# Patient Record
Sex: MALE | Race: WHITE | NOT HISPANIC OR LATINO | Employment: OTHER | URBAN - NONMETROPOLITAN AREA
[De-identification: names, ages, dates, MRNs, and addresses within clinical notes are randomized per-mention and may not be internally consistent; named-entity substitution may affect disease eponyms.]

---

## 2023-06-22 DIAGNOSIS — F32.9 MAJOR DEPRESSIVE DISORDER, REMISSION STATUS UNSPECIFIED, UNSPECIFIED WHETHER RECURRENT: Primary | ICD-10-CM

## 2023-06-22 PROBLEM — F41.9 ANXIETY DISORDER: Status: ACTIVE | Noted: 2023-06-22

## 2023-06-22 PROBLEM — F32.A DEPRESSION: Status: ACTIVE | Noted: 2023-06-22

## 2023-06-22 RX ORDER — VENLAFAXINE HYDROCHLORIDE 150 MG/1
150 CAPSULE, EXTENDED RELEASE ORAL DAILY
Qty: 30 CAPSULE | Refills: 1 | Status: SHIPPED | OUTPATIENT
Start: 2023-06-22 | End: 2023-08-01 | Stop reason: SDUPTHER

## 2023-06-22 RX ORDER — ARIPIPRAZOLE 5 MG/1
1 TABLET ORAL DAILY
COMMUNITY
Start: 2018-08-30 | End: 2023-06-22 | Stop reason: SDUPTHER

## 2023-06-22 RX ORDER — VENLAFAXINE HYDROCHLORIDE 150 MG/1
1 CAPSULE, EXTENDED RELEASE ORAL DAILY
COMMUNITY
Start: 2017-01-27 | End: 2023-06-22 | Stop reason: SDUPTHER

## 2023-06-22 RX ORDER — ARIPIPRAZOLE 5 MG/1
5 TABLET ORAL DAILY
Qty: 30 TABLET | Refills: 1 | Status: SHIPPED | OUTPATIENT
Start: 2023-06-22 | End: 2023-08-01 | Stop reason: SDUPTHER

## 2023-08-01 ENCOUNTER — OFFICE VISIT (OUTPATIENT)
Dept: PRIMARY CARE | Facility: CLINIC | Age: 68
End: 2023-08-01
Payer: MEDICARE

## 2023-08-01 VITALS
BODY MASS INDEX: 22.45 KG/M2 | DIASTOLIC BLOOD PRESSURE: 64 MMHG | HEIGHT: 68 IN | OXYGEN SATURATION: 95 % | RESPIRATION RATE: 12 BRPM | WEIGHT: 148.1 LBS | SYSTOLIC BLOOD PRESSURE: 104 MMHG | TEMPERATURE: 98.8 F | HEART RATE: 64 BPM

## 2023-08-01 DIAGNOSIS — Z00.00 MEDICARE ANNUAL WELLNESS VISIT, SUBSEQUENT: Primary | ICD-10-CM

## 2023-08-01 DIAGNOSIS — Z12.11 COLON CANCER SCREENING: ICD-10-CM

## 2023-08-01 DIAGNOSIS — F32.A ANXIETY AND DEPRESSION: ICD-10-CM

## 2023-08-01 DIAGNOSIS — E78.5 HYPERLIPIDEMIA, UNSPECIFIED HYPERLIPIDEMIA TYPE: ICD-10-CM

## 2023-08-01 DIAGNOSIS — F41.9 ANXIETY AND DEPRESSION: ICD-10-CM

## 2023-08-01 DIAGNOSIS — Z23 IMMUNIZATION DUE: ICD-10-CM

## 2023-08-01 DIAGNOSIS — Z13.89 ENCOUNTER FOR SCREENING FOR OTHER DISORDER: ICD-10-CM

## 2023-08-01 DIAGNOSIS — Z12.5 SCREENING FOR PROSTATE CANCER: ICD-10-CM

## 2023-08-01 DIAGNOSIS — F32.9 MAJOR DEPRESSIVE DISORDER, REMISSION STATUS UNSPECIFIED, UNSPECIFIED WHETHER RECURRENT: ICD-10-CM

## 2023-08-01 PROBLEM — I83.93 VARICOSE VEINS OF LEGS: Status: ACTIVE | Noted: 2023-08-01

## 2023-08-01 PROBLEM — K21.9 ACID REFLUX: Status: ACTIVE | Noted: 2023-08-01

## 2023-08-01 PROBLEM — R73.03 PREDIABETES: Status: ACTIVE | Noted: 2023-08-01

## 2023-08-01 PROBLEM — H40.9 GLAUCOMA: Status: ACTIVE | Noted: 2023-08-01

## 2023-08-01 PROBLEM — R03.0 ELEVATED BP WITHOUT DIAGNOSIS OF HYPERTENSION: Status: ACTIVE | Noted: 2023-08-01

## 2023-08-01 PROBLEM — L72.0 EPIDERMOID CYST: Status: ACTIVE | Noted: 2023-08-01

## 2023-08-01 PROBLEM — E78.00 ELEVATED LDL CHOLESTEROL LEVEL: Status: ACTIVE | Noted: 2023-08-01

## 2023-08-01 PROBLEM — E55.9 VITAMIN D DEFICIENCY: Status: ACTIVE | Noted: 2023-08-01

## 2023-08-01 PROCEDURE — 1036F TOBACCO NON-USER: CPT | Performed by: FAMILY MEDICINE

## 2023-08-01 PROCEDURE — 1159F MED LIST DOCD IN RCRD: CPT | Performed by: FAMILY MEDICINE

## 2023-08-01 PROCEDURE — G0444 DEPRESSION SCREEN ANNUAL: HCPCS | Performed by: FAMILY MEDICINE

## 2023-08-01 PROCEDURE — 90677 PCV20 VACCINE IM: CPT | Performed by: FAMILY MEDICINE

## 2023-08-01 PROCEDURE — 1170F FXNL STATUS ASSESSED: CPT | Performed by: FAMILY MEDICINE

## 2023-08-01 PROCEDURE — G0009 ADMIN PNEUMOCOCCAL VACCINE: HCPCS | Performed by: FAMILY MEDICINE

## 2023-08-01 PROCEDURE — G0438 PPPS, INITIAL VISIT: HCPCS | Performed by: FAMILY MEDICINE

## 2023-08-01 PROCEDURE — 1160F RVW MEDS BY RX/DR IN RCRD: CPT | Performed by: FAMILY MEDICINE

## 2023-08-01 RX ORDER — VENLAFAXINE HYDROCHLORIDE 150 MG/1
150 CAPSULE, EXTENDED RELEASE ORAL DAILY
Qty: 90 CAPSULE | Refills: 3 | Status: SHIPPED | OUTPATIENT
Start: 2023-08-01 | End: 2024-07-31

## 2023-08-01 RX ORDER — LATANOPROST 50 UG/ML
SOLUTION/ DROPS OPHTHALMIC
COMMUNITY

## 2023-08-01 RX ORDER — ARIPIPRAZOLE 5 MG/1
5 TABLET ORAL DAILY
Qty: 90 TABLET | Refills: 3 | Status: SHIPPED | OUTPATIENT
Start: 2023-08-01 | End: 2024-07-31

## 2023-08-01 ASSESSMENT — ACTIVITIES OF DAILY LIVING (ADL)
DRESSING: INDEPENDENT
BATHING: INDEPENDENT
GROCERY_SHOPPING: INDEPENDENT
MANAGING_FINANCES: INDEPENDENT
DOING_HOUSEWORK: INDEPENDENT
TAKING_MEDICATION: INDEPENDENT

## 2023-08-01 ASSESSMENT — PATIENT HEALTH QUESTIONNAIRE - PHQ9
1. LITTLE INTEREST OR PLEASURE IN DOING THINGS: NOT AT ALL
SUM OF ALL RESPONSES TO PHQ9 QUESTIONS 1 AND 2: 0
2. FEELING DOWN, DEPRESSED OR HOPELESS: NOT AT ALL

## 2023-08-01 ASSESSMENT — LIFESTYLE VARIABLES: HOW OFTEN DO YOU HAVE A DRINK CONTAINING ALCOHOL: MONTHLY OR LESS

## 2023-08-01 NOTE — PROGRESS NOTES
"Subjective   Reason for Visit: Vincenzo Cummings is an 68 y.o. male here for a Medicare Wellness visit.     Past Medical, Surgical, and Family History reviewed and updated in chart.    Reviewed all medications by prescribing practitioner or clinical pharmacist (such as prescriptions, OTCs, herbal therapies and supplements) and documented in the medical record.    HPI MEDICARE WELLNESS  TDAP: 2007  SHINGRIX: #1 in 11/2020 - DUE FOR #2?  PNEUMOVAX: DUE?  CSCOPE: 5/2017 polyps x 2 (NO PATH), good prep  PSA: 0.80 in 3/2018   AAA SCREEN: none found  HEP C SCREEN: none found  CACS: none found  LIPID: 3/2018 TC/HDL ratio 3.4, , TRIG 162    He states he is doing well overall.  He is  from ex, living on his own.  Bought a house, no mortgage, doing a lot of his own repairs.  Retired from work.    Continues to go to Recovery meetings.  He works in [employer], plays in [employer].    States mood is doing good.  Not currently following with psych.  Regimen includes Venlafaxine and Abilify.    Patient is agreeable to Prevnar-20 vaccine.     Prostate cancer screening: Denies family history.   Denies hesitancy, nocturia, or urgency.     Colon cancer screening: Denies family history.   Denies melena, hematochezia, constipation, diarrhea, bloating, change in bowel habits.    Thyroid disorder screening: Denies family history.  Denies heat or cold intolerance, diarrhea or constipation, coarsening of hair, and palpitations.     Cardiac disorder screening: Denies family history.  Denies chest pain, SOB, palpitations, edema, dizziness.     Patient Care Team:  Sandi Howell DO as PCP - General  Sandi Howell DO as PCP - [employer] PCP     Review of Systems   All other systems reviewed and are negative.      Objective   Vitals:  /64 (BP Location: Right arm, Patient Position: Sitting, BP Cuff Size: Small adult)   Pulse 64   Temp 37.1 °C (98.8 °F) (Temporal)   Resp 12   Ht 1.715 m (5' 7.5\")   " Wt 67.2 kg (148 lb 1.6 oz)   SpO2 95%   BMI 22.85 kg/m²       Physical Exam  Vitals and nursing note reviewed.   Constitutional:       General: He is not in acute distress.     Appearance: Normal appearance. He is not toxic-appearing.   HENT:      Head: Normocephalic and atraumatic.   Eyes:      Extraocular Movements: Extraocular movements intact.      Pupils: Pupils are equal, round, and reactive to light.   Neck:      Thyroid: No thyromegaly.   Cardiovascular:      Rate and Rhythm: Normal rate and regular rhythm.      Heart sounds: No murmur heard.     No friction rub. No gallop.   Pulmonary:      Effort: Pulmonary effort is normal.      Breath sounds: Normal breath sounds. No wheezing, rhonchi or rales.   Musculoskeletal:      Right lower leg: No edema.      Left lower leg: No edema.   Lymphadenopathy:      Cervical: No cervical adenopathy.   Skin:     General: Skin is warm and dry.   Neurological:      General: No focal deficit present.      Mental Status: He is alert and oriented to person, place, and time.   Psychiatric:         Mood and Affect: Mood normal.         Behavior: Behavior normal.         Assessment/Plan   Problem List Items Addressed This Visit       Anxiety and depression    Current Assessment & Plan     Mood is stable on current regimen of Venlafaxine 150 mg and Abilify 5 mg.  Prescriptions refilled.         Relevant Medications    ARIPiprazole (Abilify) 5 mg tablet    venlafaxine XR (Effexor-XR) 150 mg 24 hr capsule    HLD (hyperlipidemia)    Current Assessment & Plan     3/2018 TC/HDL ratio 3.4, , TRIG 162  Goal TC/HDL ratio 3.4 or less, LDL 99 or less,  or less, and TRIG 150 or less.   Repeat lipid panel ordered today.  See diet and exercise recommendations via summary.         Relevant Orders    CBC    Comprehensive Metabolic Panel    Lipid Panel    Medicare annual wellness visit, subsequent - Primary    Overview     TDAP: 2007  SHINGRIX: #1 in 11/2020 - DUE FOR  #2?  PNEUMOVAX: 8/2023  CSCOPE: 5/2017 polyps x 2 (NO PATH), good prep  PSA: 0.80 in 3/2018   AAA SCREEN: none found  HEP C SCREEN: none found  CACS: none found  LIPID: 3/2018 TC/HDL ratio 3.4, , TRIG 162         Current Assessment & Plan     Vaccines and screenings reviewed.  Questionnaires completed.  Health and wellness topics reviewed.  Diet and exercise recommendations revisited.  Routine blood work ordered today, including PSA.    Prevnar-20 administered in office today.  Tetanus booster due, can get this at pharmacy.  Shingrix #2 due, to be done at pharmacy.    The 3 Colon Cancer screening tests were reviewed with patient, FIT Test, Colonoscopy and Cologuard. Risks and benefits of each test were discussed. Patient has elected to undergo colonoscopy. Referral placed, patient to return to established GI, Dr. Dunbar.    LIFESTYLE MEASURES  -make sure you are avoiding refined carbs such as breads, pasta, cereal, candy, soda,  nutrition bars, granola, chips, and sugar sweetened beverages.      -eat 5- 7 servings daily of veggies,  healthy protein such as chicken, fish,  beans, and eggs, and include healthy fats in your diet such as seeds, nuts, olive oil, avocados, and salmon.   -exercise 4 - 6 days per week as you are able, 150 minutes total weekly divided up is recommended.  -Vitamin D is recommended at 1000 - 5000 IU international units daily.   -Always wear sunscreen when you have sun exposure.  -64 oz of water is recommended daily.  -Dental visits recommended every 6 months.  -Eye exam recommended every 2 years, for those with vision problems every year.            Other Visit Diagnoses       Encounter for screening for other disorder        Screening for prostate cancer        Relevant Orders    Prostate Specific Antigen, Screen    Colon cancer screening        Relevant Orders    Referral to Gastroenterology    Immunization due        Relevant Orders    Pneumococcal conjugate vaccine, 20-valent,  adult (PREVNAR 20) (Completed)    Major depressive disorder, remission status unspecified, unspecified whether recurrent        Relevant Medications    ARIPiprazole (Abilify) 5 mg tablet    venlafaxine XR (Effexor-XR) 150 mg 24 hr capsule          Follow-up in 1 year for recheck above + MWV.  Call for sooner follow-up if needed.     Scribe Attestation  By signing my name below, IBeatriz Scribe   attest that this documentation has been prepared under the direction and in the presence of Sandi Howell DO.

## 2023-08-01 NOTE — ASSESSMENT & PLAN NOTE
3/2018 TC/HDL ratio 3.4, , TRIG 162  Goal TC/HDL ratio 3.4 or less, LDL 99 or less,  or less, and TRIG 150 or less.   Repeat lipid panel ordered today.  See diet and exercise recommendations via summary.

## 2023-08-01 NOTE — ASSESSMENT & PLAN NOTE
Mood is stable on current regimen of Venlafaxine 150 mg and Abilify 5 mg.  Prescriptions refilled.

## 2023-08-15 ENCOUNTER — LAB (OUTPATIENT)
Dept: LAB | Facility: LAB | Age: 68
End: 2023-08-15
Payer: MEDICARE

## 2023-08-15 DIAGNOSIS — Z12.5 SCREENING FOR PROSTATE CANCER: ICD-10-CM

## 2023-08-15 DIAGNOSIS — E78.5 HYPERLIPIDEMIA, UNSPECIFIED HYPERLIPIDEMIA TYPE: ICD-10-CM

## 2023-08-15 PROCEDURE — G0103 PSA SCREENING: HCPCS

## 2023-08-15 PROCEDURE — 36415 COLL VENOUS BLD VENIPUNCTURE: CPT

## 2023-08-15 PROCEDURE — 80053 COMPREHEN METABOLIC PANEL: CPT

## 2023-08-15 PROCEDURE — 80061 LIPID PANEL: CPT

## 2023-08-15 PROCEDURE — 85027 COMPLETE CBC AUTOMATED: CPT

## 2023-08-16 LAB
ALANINE AMINOTRANSFERASE (SGPT) (U/L) IN SER/PLAS: 16 U/L (ref 10–52)
ALBUMIN (G/DL) IN SER/PLAS: 4.5 G/DL (ref 3.4–5)
ALKALINE PHOSPHATASE (U/L) IN SER/PLAS: 44 U/L (ref 33–136)
ANION GAP IN SER/PLAS: 12 MMOL/L (ref 10–20)
ASPARTATE AMINOTRANSFERASE (SGOT) (U/L) IN SER/PLAS: 18 U/L (ref 9–39)
BILIRUBIN TOTAL (MG/DL) IN SER/PLAS: 0.8 MG/DL (ref 0–1.2)
CALCIUM (MG/DL) IN SER/PLAS: 9.4 MG/DL (ref 8.6–10.6)
CARBON DIOXIDE, TOTAL (MMOL/L) IN SER/PLAS: 29 MMOL/L (ref 21–32)
CHLORIDE (MMOL/L) IN SER/PLAS: 106 MMOL/L (ref 98–107)
CHOLESTEROL (MG/DL) IN SER/PLAS: 182 MG/DL (ref 0–199)
CHOLESTEROL IN HDL (MG/DL) IN SER/PLAS: 84.1 MG/DL
CHOLESTEROL/HDL RATIO: 2.2
CREATININE (MG/DL) IN SER/PLAS: 0.9 MG/DL (ref 0.5–1.3)
ERYTHROCYTE DISTRIBUTION WIDTH (RATIO) BY AUTOMATED COUNT: 12.2 % (ref 11.5–14.5)
ERYTHROCYTE MEAN CORPUSCULAR HEMOGLOBIN CONCENTRATION (G/DL) BY AUTOMATED: 33.5 G/DL (ref 32–36)
ERYTHROCYTE MEAN CORPUSCULAR VOLUME (FL) BY AUTOMATED COUNT: 100 FL (ref 80–100)
ERYTHROCYTES (10*6/UL) IN BLOOD BY AUTOMATED COUNT: 4.61 X10E12/L (ref 4.5–5.9)
GFR MALE: >90 ML/MIN/1.73M2
GLUCOSE (MG/DL) IN SER/PLAS: 98 MG/DL (ref 74–99)
HEMATOCRIT (%) IN BLOOD BY AUTOMATED COUNT: 46.3 % (ref 41–52)
HEMOGLOBIN (G/DL) IN BLOOD: 15.5 G/DL (ref 13.5–17.5)
LDL: 87 MG/DL (ref 0–99)
LEUKOCYTES (10*3/UL) IN BLOOD BY AUTOMATED COUNT: 5.4 X10E9/L (ref 4.4–11.3)
NRBC (PER 100 WBCS) BY AUTOMATED COUNT: 0 /100 WBC (ref 0–0)
PLATELETS (10*3/UL) IN BLOOD AUTOMATED COUNT: 275 X10E9/L (ref 150–450)
POTASSIUM (MMOL/L) IN SER/PLAS: 4.3 MMOL/L (ref 3.5–5.3)
PROSTATE SPECIFIC ANTIGEN,SCREEN: 0.93 NG/ML (ref 0–4)
PROTEIN TOTAL: 7 G/DL (ref 6.4–8.2)
SODIUM (MMOL/L) IN SER/PLAS: 143 MMOL/L (ref 136–145)
TRIGLYCERIDE (MG/DL) IN SER/PLAS: 54 MG/DL (ref 0–149)
UREA NITROGEN (MG/DL) IN SER/PLAS: 13 MG/DL (ref 6–23)
VLDL: 11 MG/DL (ref 0–40)

## 2024-04-10 ENCOUNTER — OFFICE (OUTPATIENT)
Dept: URBAN - METROPOLITAN AREA CLINIC 26 | Facility: CLINIC | Age: 69
End: 2024-04-10
Payer: MEDICARE

## 2024-04-10 VITALS
TEMPERATURE: 98.7 F | HEART RATE: 63 BPM | HEIGHT: 68 IN | DIASTOLIC BLOOD PRESSURE: 71 MMHG | SYSTOLIC BLOOD PRESSURE: 122 MMHG | WEIGHT: 154 LBS

## 2024-04-10 DIAGNOSIS — F41.9 ANXIETY DISORDER, UNSPECIFIED: ICD-10-CM

## 2024-04-10 DIAGNOSIS — R13.10 DYSPHAGIA, UNSPECIFIED: ICD-10-CM

## 2024-04-10 DIAGNOSIS — Z86.010 PERSONAL HISTORY OF COLONIC POLYPS: ICD-10-CM

## 2024-04-10 DIAGNOSIS — Z09 ENCOUNTER FOR FOLLOW-UP EXAMINATION AFTER COMPLETED TREATMEN: ICD-10-CM

## 2024-04-10 PROCEDURE — 99204 OFFICE O/P NEW MOD 45 MIN: CPT | Performed by: INTERNAL MEDICINE

## 2024-04-24 ENCOUNTER — OFFICE (OUTPATIENT)
Dept: URBAN - METROPOLITAN AREA PATHOLOGY 2 | Facility: PATHOLOGY | Age: 69
End: 2024-04-24
Payer: MEDICARE

## 2024-04-24 ENCOUNTER — AMBULATORY SURGICAL CENTER (OUTPATIENT)
Dept: URBAN - METROPOLITAN AREA SURGERY 12 | Facility: SURGERY | Age: 69
End: 2024-04-24
Payer: MEDICARE

## 2024-04-24 VITALS
SYSTOLIC BLOOD PRESSURE: 73 MMHG | DIASTOLIC BLOOD PRESSURE: 61 MMHG | DIASTOLIC BLOOD PRESSURE: 71 MMHG | DIASTOLIC BLOOD PRESSURE: 65 MMHG | SYSTOLIC BLOOD PRESSURE: 144 MMHG | HEART RATE: 62 BPM | DIASTOLIC BLOOD PRESSURE: 38 MMHG | DIASTOLIC BLOOD PRESSURE: 50 MMHG | OXYGEN SATURATION: 100 % | HEIGHT: 68 IN | RESPIRATION RATE: 14 BRPM | RESPIRATION RATE: 5 BRPM | HEART RATE: 67 BPM | HEART RATE: 66 BPM | DIASTOLIC BLOOD PRESSURE: 75 MMHG | OXYGEN SATURATION: 100 % | RESPIRATION RATE: 9 BRPM | HEART RATE: 64 BPM | HEART RATE: 71 BPM | HEART RATE: 62 BPM | DIASTOLIC BLOOD PRESSURE: 43 MMHG | SYSTOLIC BLOOD PRESSURE: 113 MMHG | OXYGEN SATURATION: 99 % | RESPIRATION RATE: 6 BRPM | TEMPERATURE: 98.9 F | SYSTOLIC BLOOD PRESSURE: 87 MMHG | SYSTOLIC BLOOD PRESSURE: 86 MMHG | OXYGEN SATURATION: 97 % | OXYGEN SATURATION: 98 % | RESPIRATION RATE: 11 BRPM | SYSTOLIC BLOOD PRESSURE: 74 MMHG | RESPIRATION RATE: 14 BRPM | RESPIRATION RATE: 13 BRPM | SYSTOLIC BLOOD PRESSURE: 111 MMHG | DIASTOLIC BLOOD PRESSURE: 75 MMHG | HEART RATE: 70 BPM | DIASTOLIC BLOOD PRESSURE: 65 MMHG | DIASTOLIC BLOOD PRESSURE: 71 MMHG | HEART RATE: 67 BPM | TEMPERATURE: 98.9 F | SYSTOLIC BLOOD PRESSURE: 85 MMHG | SYSTOLIC BLOOD PRESSURE: 111 MMHG | DIASTOLIC BLOOD PRESSURE: 62 MMHG | SYSTOLIC BLOOD PRESSURE: 140 MMHG | RESPIRATION RATE: 8 BRPM | HEART RATE: 64 BPM | RESPIRATION RATE: 13 BRPM | OXYGEN SATURATION: 99 % | DIASTOLIC BLOOD PRESSURE: 65 MMHG | WEIGHT: 145 LBS | RESPIRATION RATE: 9 BRPM | DIASTOLIC BLOOD PRESSURE: 41 MMHG | HEART RATE: 71 BPM | SYSTOLIC BLOOD PRESSURE: 74 MMHG | DIASTOLIC BLOOD PRESSURE: 48 MMHG | OXYGEN SATURATION: 98 % | HEIGHT: 68 IN | DIASTOLIC BLOOD PRESSURE: 43 MMHG | HEART RATE: 73 BPM | RESPIRATION RATE: 15 BRPM | DIASTOLIC BLOOD PRESSURE: 50 MMHG | RESPIRATION RATE: 15 BRPM | RESPIRATION RATE: 12 BRPM | HEART RATE: 70 BPM | RESPIRATION RATE: 7 BRPM | SYSTOLIC BLOOD PRESSURE: 73 MMHG | RESPIRATION RATE: 5 BRPM | DIASTOLIC BLOOD PRESSURE: 87 MMHG | RESPIRATION RATE: 9 BRPM | OXYGEN SATURATION: 97 % | HEART RATE: 73 BPM | DIASTOLIC BLOOD PRESSURE: 41 MMHG | DIASTOLIC BLOOD PRESSURE: 50 MMHG | RESPIRATION RATE: 8 BRPM | RESPIRATION RATE: 15 BRPM | SYSTOLIC BLOOD PRESSURE: 85 MMHG | HEART RATE: 67 BPM | SYSTOLIC BLOOD PRESSURE: 135 MMHG | HEART RATE: 65 BPM | DIASTOLIC BLOOD PRESSURE: 43 MMHG | HEART RATE: 71 BPM | SYSTOLIC BLOOD PRESSURE: 135 MMHG | SYSTOLIC BLOOD PRESSURE: 144 MMHG | SYSTOLIC BLOOD PRESSURE: 140 MMHG | DIASTOLIC BLOOD PRESSURE: 48 MMHG | SYSTOLIC BLOOD PRESSURE: 144 MMHG | DIASTOLIC BLOOD PRESSURE: 61 MMHG | OXYGEN SATURATION: 100 % | HEART RATE: 66 BPM | SYSTOLIC BLOOD PRESSURE: 87 MMHG | HEART RATE: 73 BPM | SYSTOLIC BLOOD PRESSURE: 74 MMHG | RESPIRATION RATE: 7 BRPM | SYSTOLIC BLOOD PRESSURE: 113 MMHG | DIASTOLIC BLOOD PRESSURE: 38 MMHG | SYSTOLIC BLOOD PRESSURE: 135 MMHG | DIASTOLIC BLOOD PRESSURE: 87 MMHG | OXYGEN SATURATION: 99 % | HEART RATE: 65 BPM | RESPIRATION RATE: 7 BRPM | HEART RATE: 64 BPM | DIASTOLIC BLOOD PRESSURE: 62 MMHG | RESPIRATION RATE: 5 BRPM | SYSTOLIC BLOOD PRESSURE: 86 MMHG | RESPIRATION RATE: 11 BRPM | SYSTOLIC BLOOD PRESSURE: 83 MMHG | OXYGEN SATURATION: 98 % | RESPIRATION RATE: 12 BRPM | DIASTOLIC BLOOD PRESSURE: 61 MMHG | HEART RATE: 70 BPM | HEART RATE: 66 BPM | TEMPERATURE: 98.9 F | SYSTOLIC BLOOD PRESSURE: 111 MMHG | DIASTOLIC BLOOD PRESSURE: 38 MMHG | RESPIRATION RATE: 14 BRPM | HEART RATE: 62 BPM | RESPIRATION RATE: 12 BRPM | DIASTOLIC BLOOD PRESSURE: 45 MMHG | SYSTOLIC BLOOD PRESSURE: 83 MMHG | RESPIRATION RATE: 6 BRPM | SYSTOLIC BLOOD PRESSURE: 86 MMHG | DIASTOLIC BLOOD PRESSURE: 45 MMHG | WEIGHT: 145 LBS | SYSTOLIC BLOOD PRESSURE: 87 MMHG | RESPIRATION RATE: 6 BRPM | DIASTOLIC BLOOD PRESSURE: 71 MMHG | SYSTOLIC BLOOD PRESSURE: 117 MMHG | SYSTOLIC BLOOD PRESSURE: 73 MMHG | WEIGHT: 145 LBS | SYSTOLIC BLOOD PRESSURE: 85 MMHG | SYSTOLIC BLOOD PRESSURE: 140 MMHG | SYSTOLIC BLOOD PRESSURE: 113 MMHG | DIASTOLIC BLOOD PRESSURE: 41 MMHG | RESPIRATION RATE: 8 BRPM | SYSTOLIC BLOOD PRESSURE: 117 MMHG | SYSTOLIC BLOOD PRESSURE: 83 MMHG | HEART RATE: 65 BPM | DIASTOLIC BLOOD PRESSURE: 75 MMHG | RESPIRATION RATE: 13 BRPM | SYSTOLIC BLOOD PRESSURE: 117 MMHG | RESPIRATION RATE: 11 BRPM | OXYGEN SATURATION: 97 % | DIASTOLIC BLOOD PRESSURE: 45 MMHG | DIASTOLIC BLOOD PRESSURE: 62 MMHG | HEIGHT: 68 IN | DIASTOLIC BLOOD PRESSURE: 87 MMHG | DIASTOLIC BLOOD PRESSURE: 48 MMHG

## 2024-04-24 DIAGNOSIS — Z09 ENCOUNTER FOR FOLLOW-UP EXAMINATION AFTER COMPLETED TREATMEN: ICD-10-CM

## 2024-04-24 DIAGNOSIS — K63.5 POLYP OF COLON: ICD-10-CM

## 2024-04-24 DIAGNOSIS — K57.30 DIVERTICULOSIS OF LARGE INTESTINE WITHOUT PERFORATION OR ABS: ICD-10-CM

## 2024-04-24 DIAGNOSIS — K64.4 RESIDUAL HEMORRHOIDAL SKIN TAGS: ICD-10-CM

## 2024-04-24 DIAGNOSIS — K64.8 OTHER HEMORRHOIDS: ICD-10-CM

## 2024-04-24 DIAGNOSIS — Z86.010 PERSONAL HISTORY OF COLONIC POLYPS: ICD-10-CM

## 2024-04-24 PROCEDURE — 45380 COLONOSCOPY AND BIOPSY: CPT | Performed by: INTERNAL MEDICINE

## 2024-04-24 PROCEDURE — 88305 TISSUE EXAM BY PATHOLOGIST: CPT | Mod: TC | Performed by: PATHOLOGY

## 2024-07-21 DIAGNOSIS — F32.9 MAJOR DEPRESSIVE DISORDER, REMISSION STATUS UNSPECIFIED, UNSPECIFIED WHETHER RECURRENT: ICD-10-CM

## 2024-07-22 RX ORDER — VENLAFAXINE HYDROCHLORIDE 150 MG/1
150 CAPSULE, EXTENDED RELEASE ORAL DAILY
Qty: 90 CAPSULE | Refills: 3 | Status: SHIPPED | OUTPATIENT
Start: 2024-07-22

## 2024-07-22 RX ORDER — ARIPIPRAZOLE 5 MG/1
5 TABLET ORAL DAILY
Qty: 90 TABLET | Refills: 3 | Status: SHIPPED | OUTPATIENT
Start: 2024-07-22

## 2024-07-31 NOTE — PROGRESS NOTES
Subjective   Reason for Visit: Vincenzo Cummings is an 69 y.o. male here for a Medicare Wellness visit.     Past Medical, Surgical, and Family History reviewed and updated in chart.    Reviewed all medications by prescribing practitioner or clinical pharmacist (such as prescriptions, OTCs, herbal therapies and supplements) and documented in the medical record.    HPI    Patient presents today for routine FUV + MWV  ABN signed    Patient concerns:  #discuss prostate screening - wants testing  Nocturia 1-2 times per night  If drinks a lot of water may go more  States not problematic urinary symptoms    MEDICARE WELLNESS VISIT   TDAP: 2007 - DUE  SHINGRIX: #1 in 11/2020 - DUE FOR #2?  PNEUMOVAX: completed  CSCOPE: 4/2024 (polyp x 1 (NO PATH), mild diverticulosis, internal and ext hemorrhoids)  PSA: 0.93 in 8/2023  AAA SCREEN: n/a (never smoker)  HEP C SCREEN: none found  CACS: none found    Retired from work.  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  Denies hesitancy, or urgency.     Colon cancer screening: utd  Denies family history in first degree relative.  Denies melena, hematochezia, constipation, diarrhea, bloating, change in bowel habits.    ROUTINE VISIT  CHRONIC CONDITIONS:     Mood  Hx of anxiety and depression    Current regimen:  Venlafaxine 150 mg daily  Abilify 5 mg daily    PHQ-9: 1 - Q9 was 0  ROLAND-7: 2    Patient is taking and tolerating the medications as prescribed.   Patient denies suicidal ideation or homicidal ideation.   Patient denies any symptoms of missy such as pressured speech, impulsive purchases.  Patient reports good control on the current  "medication.  Patient is satisfied with their current regimen and wishes to continue.     HLD  Lifestyle managed  No CACS in our records    8/2023 TC/HDL ratio 2.2, LDL   87, Trig 54  3/2018 TC/HDL ratio 3.4, , Trig 162     Patient denies any facial droop, sudden vision loss, weakness or numbness on one side of body.   Patient denies chest pain, shortness of breath with exertion, palpitations, or symptoms of claudication.     Patient Care Team:  Sandi Howell DO as PCP - General  Sandi Howell DO as PCP - [employer]vantage PCP     Review of Systems   All other systems reviewed and are negative.      Objective   Vitals:  /87 (BP Location: Right arm, Patient Position: Sitting, BP Cuff Size: Adult)   Pulse 62   Temp 36.3 °C (97.4 °F) (Temporal)   Ht 1.715 m (5' 7.5\")   Wt 68.8 kg (151 lb 9.6 oz)   SpO2 97%   BMI 23.39 kg/m²       Physical Exam  Vitals and nursing note reviewed.   Constitutional:       General: He is not in acute distress.     Appearance: Normal appearance. He is not toxic-appearing.   HENT:      Head: Normocephalic and atraumatic.   Eyes:      Pupils: Pupils are equal, round, and reactive to light.   Neck:      Thyroid: No thyromegaly.   Cardiovascular:      Rate and Rhythm: Normal rate and regular rhythm.      Heart sounds: No murmur heard.     No friction rub. No gallop.   Pulmonary:      Effort: Pulmonary effort is normal.      Breath sounds: Normal breath sounds. No wheezing, rhonchi or rales.   Abdominal:      Palpations: Abdomen is soft. There is no mass.      Tenderness: There is no abdominal tenderness. There is no guarding.   Musculoskeletal:      Right lower leg: No edema.      Left lower leg: No edema.   Lymphadenopathy:      Cervical: No cervical adenopathy.   Skin:     General: Skin is warm and dry.   Neurological:      General: No focal deficit present.      Mental Status: He is alert and oriented to person, place, and time.   Psychiatric:         " Mood and Affect: Mood normal.         Behavior: Behavior normal.         Assessment/Plan   Problem List Items Addressed This Visit             ICD-10-CM    Anxiety and depression F41.9, F32.A     Scales and scores have been reviewed and discussed with patient, these indicate clinical remission.  Since mood is stable will continue on current regimen of Venlafaxine 150 mg and Abilify 5 mg.  Recheck mood annually or sooner as needs arise.         Elevated BP without diagnosis of hypertension R03.0     /87 in office today, has been labile per prior office screenings  Will recheck BP prior to discharge - /79  I have asked that he monitor BP outside the office - please do this with ARM CUFF - can get at local drug store.  If BP consistently greater than 130/80 he should reach out for appointment to address HTN sooner  Otherwise follow-up in office in 3 months for recheck BP.    Patient is encouraged to continue low sodium diet (Dietary Approach to Stop Hypertension, or DASH diet) .   Exercise most days of the week.   Limit refined carbohydrates such as pretzels, cereals, breads, pastries, alcohol.    If patient wishes to try a natural approach to lowering blood pressure, can consider:  -whey protein 20 -30 g daily (hydrolyzed is best, but any is ok)  -aged garlic 600 mg twice daily (Kyolic brand aged garlic on line is one example)   -CoQ10 supplement at 120 mg - 360 mg daily (average dose studied 225mg daily).     Patient to check BP regularly at home and keep a diary  This should be taken after sitting with feet flat on floor and resting for 5 minutes.   Arm should be level with your heart.   New guidelines recommend goal for blood pressure less than 130/80.   Ideally for stroke and heart attack risk reduction the systolic, or top, blood pressure number should be in the 110's or 120's.    PLEASE BRING BP CUFF IN TO NEXT VISIT FOR VALIDATION - TAKE YOUR BP @ HOME BEFORE YOU COME!          Relevant Orders     CBC    Comprehensive Metabolic Panel    HLD (hyperlipidemia) E78.5     8/2023 TC/HDL ratio 2.2, LDL   87, Trig 54  3/2018 TC/HDL ratio 3.4, , Trig 162   Goal TC/HDL ratio 3.4 or less, LDL 99 or less,  or less, and TRIG 150 or less.     Cholesterol historically at goal with lifestyle efforts.  Repeat lipid panel ordered today.  Diet and exercise recommendations revisited.          Relevant Orders    CBC    Comprehensive Metabolic Panel    Lipid Panel    Medicare annual wellness visit, subsequent - Primary Z00.00     Vaccines and screenings reviewed.  Questionnaires completed.  Health and wellness topics reviewed.  Diet and exercise recommendations revisited.  Routine blood work ordered today.     VACCINES:  -TDAP last completed 2007 in our records, due every 10 years, patient to check his records and complete at pharmacy if not done.  -Shingrix #1 in 11/2020, repeat due, to be done at pharmacy  -Pneumonia vaccine previously completed, good for lifetime.    SCREENINGS:  -Screening PSA last completed 8/2023, repeat PSA lab ordered today  -Screening colonoscopy last completed 2024, repeat as per GI    LIFESTYLE MEASURES  -consider increasing protein intake provided no issues with kidneys to 1 gram per 1 pound of ideal body weight per not to exceed 150 gram per day. May have to supplement with a protein powder to achieve this goal.  -make sure you are avoiding refined carbs such as breads, pasta, cereal, candy, soda,  nutrition bars, granola, chips, and sugar sweetened beverages.      -eat 5- 7 servings daily of veggies,  healthy protein such as chicken, fish,  beans, and eggs, and include healthy fats in your diet such as seeds, nuts, olive oil, avocados, and salmon.   -exercise 4 - 6 days per week as you are able, 150 minutes total weekly divided up is recommended with 3-4 of those days including resistance/strength training.  -Vitamin D is recommended at 1000 - 5000 IU international units daily.   -Always  wear sunscreen when you have sun exposure.  -64 oz of water is recommended daily.  -Dental visits recommended every 6 months.  -Eye exam recommended every 2 years, for those with vision problems every year.            Other Visit Diagnoses         Codes    Encounter for screening for other disorder     Z13.89    Body mass index (BMI) of 23.0 to 23.9 in adult     Z68.23    Screening for prostate cancer     Z12.5    Relevant Orders    Prostate Specific Antigen, Screen              Follow-up in 3 months for recheck BP + cuff calibration.  Call for sooner follow-up if needed.         Scribe Attestation  By signing my name below, IBeatriz Scribe   attest that this documentation has been prepared under the direction and in the presence of Sandi Howell DO.

## 2024-08-01 ENCOUNTER — LAB (OUTPATIENT)
Dept: LAB | Facility: LAB | Age: 69
End: 2024-08-01
Payer: MEDICARE

## 2024-08-01 ENCOUNTER — APPOINTMENT (OUTPATIENT)
Dept: PRIMARY CARE | Facility: CLINIC | Age: 69
End: 2024-08-01
Payer: MEDICARE

## 2024-08-01 VITALS
BODY MASS INDEX: 22.97 KG/M2 | HEART RATE: 62 BPM | HEIGHT: 68 IN | TEMPERATURE: 97.4 F | SYSTOLIC BLOOD PRESSURE: 146 MMHG | DIASTOLIC BLOOD PRESSURE: 87 MMHG | OXYGEN SATURATION: 97 % | WEIGHT: 151.6 LBS

## 2024-08-01 DIAGNOSIS — F32.A ANXIETY AND DEPRESSION: ICD-10-CM

## 2024-08-01 DIAGNOSIS — Z00.00 MEDICARE ANNUAL WELLNESS VISIT, SUBSEQUENT: Primary | ICD-10-CM

## 2024-08-01 DIAGNOSIS — Z12.5 SCREENING FOR PROSTATE CANCER: ICD-10-CM

## 2024-08-01 DIAGNOSIS — R03.0 ELEVATED BP WITHOUT DIAGNOSIS OF HYPERTENSION: ICD-10-CM

## 2024-08-01 DIAGNOSIS — E78.5 HYPERLIPIDEMIA, UNSPECIFIED HYPERLIPIDEMIA TYPE: ICD-10-CM

## 2024-08-01 DIAGNOSIS — Z13.89 ENCOUNTER FOR SCREENING FOR OTHER DISORDER: ICD-10-CM

## 2024-08-01 DIAGNOSIS — F41.9 ANXIETY AND DEPRESSION: ICD-10-CM

## 2024-08-01 PROBLEM — I83.90 VARICOSE VEINS OF LOWER EXTREMITY: Status: ACTIVE | Noted: 2024-08-01

## 2024-08-01 PROBLEM — S82.892A ANKLE FRACTURE, LEFT: Status: RESOLVED | Noted: 2017-04-28 | Resolved: 2024-08-01

## 2024-08-01 PROBLEM — D12.6 BENIGN TUBULAR ADENOMA OF LARGE INTESTINE: Status: ACTIVE | Noted: 2024-08-01

## 2024-08-01 LAB
ALBUMIN SERPL BCP-MCNC: 4.8 G/DL (ref 3.4–5)
ALP SERPL-CCNC: 54 U/L (ref 33–136)
ALT SERPL W P-5'-P-CCNC: 19 U/L (ref 10–52)
ANION GAP SERPL CALC-SCNC: 12 MMOL/L (ref 10–20)
AST SERPL W P-5'-P-CCNC: 18 U/L (ref 9–39)
BILIRUB SERPL-MCNC: 0.8 MG/DL (ref 0–1.2)
BUN SERPL-MCNC: 12 MG/DL (ref 6–23)
CALCIUM SERPL-MCNC: 9.9 MG/DL (ref 8.6–10.6)
CHLORIDE SERPL-SCNC: 103 MMOL/L (ref 98–107)
CHOLEST SERPL-MCNC: 186 MG/DL (ref 0–199)
CHOLESTEROL/HDL RATIO: 2.4
CO2 SERPL-SCNC: 30 MMOL/L (ref 21–32)
CREAT SERPL-MCNC: 0.84 MG/DL (ref 0.5–1.3)
EGFRCR SERPLBLD CKD-EPI 2021: >90 ML/MIN/1.73M*2
ERYTHROCYTE [DISTWIDTH] IN BLOOD BY AUTOMATED COUNT: 12.2 % (ref 11.5–14.5)
GLUCOSE SERPL-MCNC: 107 MG/DL (ref 74–99)
HCT VFR BLD AUTO: 47.3 % (ref 41–52)
HDLC SERPL-MCNC: 76 MG/DL
HGB BLD-MCNC: 16.2 G/DL (ref 13.5–17.5)
LDLC SERPL CALC-MCNC: 92 MG/DL
MCH RBC QN AUTO: 32.7 PG (ref 26–34)
MCHC RBC AUTO-ENTMCNC: 34.2 G/DL (ref 32–36)
MCV RBC AUTO: 96 FL (ref 80–100)
NON HDL CHOLESTEROL: 110 MG/DL (ref 0–149)
NRBC BLD-RTO: 0 /100 WBCS (ref 0–0)
PLATELET # BLD AUTO: 281 X10*3/UL (ref 150–450)
POTASSIUM SERPL-SCNC: 4.7 MMOL/L (ref 3.5–5.3)
PROT SERPL-MCNC: 7.2 G/DL (ref 6.4–8.2)
PSA SERPL-MCNC: 0.85 NG/ML
RBC # BLD AUTO: 4.95 X10*6/UL (ref 4.5–5.9)
SODIUM SERPL-SCNC: 140 MMOL/L (ref 136–145)
TRIGL SERPL-MCNC: 92 MG/DL (ref 0–149)
VLDL: 18 MG/DL (ref 0–40)
WBC # BLD AUTO: 4.6 X10*3/UL (ref 4.4–11.3)

## 2024-08-01 PROCEDURE — 1170F FXNL STATUS ASSESSED: CPT | Performed by: FAMILY MEDICINE

## 2024-08-01 PROCEDURE — 1160F RVW MEDS BY RX/DR IN RCRD: CPT | Performed by: FAMILY MEDICINE

## 2024-08-01 PROCEDURE — 80053 COMPREHEN METABOLIC PANEL: CPT

## 2024-08-01 PROCEDURE — 1123F ACP DISCUSS/DSCN MKR DOCD: CPT | Performed by: FAMILY MEDICINE

## 2024-08-01 PROCEDURE — 3008F BODY MASS INDEX DOCD: CPT | Performed by: FAMILY MEDICINE

## 2024-08-01 PROCEDURE — G0439 PPPS, SUBSEQ VISIT: HCPCS | Performed by: FAMILY MEDICINE

## 2024-08-01 PROCEDURE — 85027 COMPLETE CBC AUTOMATED: CPT

## 2024-08-01 PROCEDURE — 36415 COLL VENOUS BLD VENIPUNCTURE: CPT

## 2024-08-01 PROCEDURE — 1159F MED LIST DOCD IN RCRD: CPT | Performed by: FAMILY MEDICINE

## 2024-08-01 PROCEDURE — 1036F TOBACCO NON-USER: CPT | Performed by: FAMILY MEDICINE

## 2024-08-01 PROCEDURE — G0103 PSA SCREENING: HCPCS

## 2024-08-01 PROCEDURE — 80061 LIPID PANEL: CPT

## 2024-08-01 RX ORDER — GLUCOSAM/CHONDRO/HERB 149/HYAL 750-100 MG
TABLET ORAL
COMMUNITY

## 2024-08-01 ASSESSMENT — ANXIETY QUESTIONNAIRES
2. NOT BEING ABLE TO STOP OR CONTROL WORRYING: NOT AT ALL
GAD7 TOTAL SCORE: 2
5. BEING SO RESTLESS THAT IT IS HARD TO SIT STILL: NOT AT ALL
1. FEELING NERVOUS, ANXIOUS, OR ON EDGE: SEVERAL DAYS
6. BECOMING EASILY ANNOYED OR IRRITABLE: NOT AT ALL
7. FEELING AFRAID AS IF SOMETHING AWFUL MIGHT HAPPEN: SEVERAL DAYS
4. TROUBLE RELAXING: NOT AT ALL
IF YOU CHECKED OFF ANY PROBLEMS ON THIS QUESTIONNAIRE, HOW DIFFICULT HAVE THESE PROBLEMS MADE IT FOR YOU TO DO YOUR WORK, TAKE CARE OF THINGS AT HOME, OR GET ALONG WITH OTHER PEOPLE: NOT DIFFICULT AT ALL
3. WORRYING TOO MUCH ABOUT DIFFERENT THINGS: NOT AT ALL

## 2024-08-01 ASSESSMENT — PATIENT HEALTH QUESTIONNAIRE - PHQ9
SUM OF ALL RESPONSES TO PHQ QUESTIONS 1-9: 1
SUM OF ALL RESPONSES TO PHQ9 QUESTIONS 1 AND 2: 0
1. LITTLE INTEREST OR PLEASURE IN DOING THINGS: NOT AT ALL
1. LITTLE INTEREST OR PLEASURE IN DOING THINGS: NOT AT ALL
3. TROUBLE FALLING OR STAYING ASLEEP OR SLEEPING TOO MUCH: NOT AT ALL
2. FEELING DOWN, DEPRESSED OR HOPELESS: NOT AT ALL
10. IF YOU CHECKED OFF ANY PROBLEMS, HOW DIFFICULT HAVE THESE PROBLEMS MADE IT FOR YOU TO DO YOUR WORK, TAKE CARE OF THINGS AT HOME, OR GET ALONG WITH OTHER PEOPLE: NOT DIFFICULT AT ALL
9. THOUGHTS THAT YOU WOULD BE BETTER OFF DEAD, OR OF HURTING YOURSELF: NOT AT ALL
6. FEELING BAD ABOUT YOURSELF - OR THAT YOU ARE A FAILURE OR HAVE LET YOURSELF OR YOUR FAMILY DOWN: NOT AT ALL
2. FEELING DOWN, DEPRESSED OR HOPELESS: NOT AT ALL
5. POOR APPETITE OR OVEREATING: NOT AT ALL
4. FEELING TIRED OR HAVING LITTLE ENERGY: SEVERAL DAYS
8. MOVING OR SPEAKING SO SLOWLY THAT OTHER PEOPLE COULD HAVE NOTICED. OR THE OPPOSITE, BEING SO FIGETY OR RESTLESS THAT YOU HAVE BEEN MOVING AROUND A LOT MORE THAN USUAL: NOT AT ALL
7. TROUBLE CONCENTRATING ON THINGS, SUCH AS READING THE NEWSPAPER OR WATCHING TELEVISION: NOT AT ALL
SUM OF ALL RESPONSES TO PHQ9 QUESTIONS 1 AND 2: 0

## 2024-08-01 ASSESSMENT — ACTIVITIES OF DAILY LIVING (ADL)
DRESSING: INDEPENDENT
DOING_HOUSEWORK: INDEPENDENT
MANAGING_FINANCES: INDEPENDENT
GROCERY_SHOPPING: INDEPENDENT
BATHING: INDEPENDENT
TAKING_MEDICATION: INDEPENDENT

## 2024-08-01 NOTE — ASSESSMENT & PLAN NOTE
8/2023 TC/HDL ratio 2.2, LDL   87, Trig 54  3/2018 TC/HDL ratio 3.4, , Trig 162   Goal TC/HDL ratio 3.4 or less, LDL 99 or less,  or less, and TRIG 150 or less.     Cholesterol historically at goal with lifestyle efforts.  Repeat lipid panel ordered today.  Diet and exercise recommendations revisited.

## 2024-08-01 NOTE — ASSESSMENT & PLAN NOTE
Vaccines and screenings reviewed.  Questionnaires completed.  Health and wellness topics reviewed.  Diet and exercise recommendations revisited.  Routine blood work ordered today.     VACCINES:  -TDAP last completed 2007 in our records, due every 10 years, patient to check his records and complete at pharmacy if not done.  -Shingrix #1 in 11/2020, repeat due, to be done at pharmacy  -Pneumonia vaccine previously completed, good for lifetime.    SCREENINGS:  -Screening PSA last completed 8/2023, repeat PSA lab ordered today  -Screening colonoscopy last completed 2024, repeat as per GI    LIFESTYLE MEASURES  -consider increasing protein intake provided no issues with kidneys to 1 gram per 1 pound of ideal body weight per not to exceed 150 gram per day. May have to supplement with a protein powder to achieve this goal.  -make sure you are avoiding refined carbs such as breads, pasta, cereal, candy, soda,  nutrition bars, granola, chips, and sugar sweetened beverages.      -eat 5- 7 servings daily of veggies,  healthy protein such as chicken, fish,  beans, and eggs, and include healthy fats in your diet such as seeds, nuts, olive oil, avocados, and salmon.   -exercise 4 - 6 days per week as you are able, 150 minutes total weekly divided up is recommended with 3-4 of those days including resistance/strength training.  -Vitamin D is recommended at 1000 - 5000 IU international units daily.   -Always wear sunscreen when you have sun exposure.  -64 oz of water is recommended daily.  -Dental visits recommended every 6 months.  -Eye exam recommended every 2 years, for those with vision problems every year.

## 2024-08-01 NOTE — ASSESSMENT & PLAN NOTE
/87 in office today, has been labile per prior office screenings  Will recheck BP prior to discharge - /79  I have asked that he monitor BP outside the office - please do this with ARM CUFF - can get at local drug store.  If BP consistently greater than 130/80 he should reach out for appointment to address HTN sooner  Otherwise follow-up in office in 3 months for recheck BP.    Patient is encouraged to continue low sodium diet (Dietary Approach to Stop Hypertension, or DASH diet) .   Exercise most days of the week.   Limit refined carbohydrates such as pretzels, cereals, breads, pastries, alcohol.    If patient wishes to try a natural approach to lowering blood pressure, can consider:  -whey protein 20 -30 g daily (hydrolyzed is best, but any is ok)  -aged garlic 600 mg twice daily (Kyolic brand aged garlic on line is one example)   -CoQ10 supplement at 120 mg - 360 mg daily (average dose studied 225mg daily).     Patient to check BP regularly at home and keep a diary  This should be taken after sitting with feet flat on floor and resting for 5 minutes.   Arm should be level with your heart.   New guidelines recommend goal for blood pressure less than 130/80.   Ideally for stroke and heart attack risk reduction the systolic, or top, blood pressure number should be in the 110's or 120's.    PLEASE BRING BP CUFF IN TO NEXT VISIT FOR VALIDATION - TAKE YOUR BP @ HOME BEFORE YOU COME!

## 2024-08-01 NOTE — PATIENT INSTRUCTIONS
Check on last tetanus   Schedule second shingrix  (both at pharm)   Make dental appointment     Patient to inquire about durable power of atty   (thinking brother?   Patient will ask him)     Check blood pressure outside of the office   Goal   120-130 /  60-80      Patient is encouraged to continue low sodium diet (Dietary Approach to Stop Hypertension, or DASH diet) .   Exercise most days of the week.   Limit refined carbohydrates such as pretzels, cereals, breads, pastries, alcohol.    If patient wishes to try a natural approach to lowering blood pressure, can consider:  -whey protein 20 -30 g daily (hydrolyzed is best, but any is ok)  -aged garlic 600 mg twice daily (Kyolic brand aged garlic on line is one example)   -CoQ10 supplement at 120 mg - 360 mg daily (average dose studied 225mg daily).      If  bp numbers above goal  call for appointment     Follow up in 6

## 2024-08-01 NOTE — ASSESSMENT & PLAN NOTE
Scales and scores have been reviewed and discussed with patient, these indicate clinical remission.  Since mood is stable will continue on current regimen of Venlafaxine 150 mg and Abilify 5 mg.  Recheck mood annually or sooner as needs arise.

## 2024-11-01 ENCOUNTER — APPOINTMENT (OUTPATIENT)
Dept: PRIMARY CARE | Facility: CLINIC | Age: 69
End: 2024-11-01
Payer: MEDICARE

## 2024-11-01 VITALS
BODY MASS INDEX: 23.7 KG/M2 | OXYGEN SATURATION: 97 % | SYSTOLIC BLOOD PRESSURE: 148 MMHG | WEIGHT: 153.6 LBS | DIASTOLIC BLOOD PRESSURE: 91 MMHG | HEART RATE: 72 BPM | TEMPERATURE: 98.2 F

## 2024-11-01 DIAGNOSIS — M19.071 ARTHRITIS OF FIRST METATARSOPHALANGEAL (MTP) JOINT OF RIGHT FOOT: ICD-10-CM

## 2024-11-01 DIAGNOSIS — Z13.6 SCREENING FOR CARDIOVASCULAR CONDITION: ICD-10-CM

## 2024-11-01 DIAGNOSIS — M19.90 ARTHRITIS: ICD-10-CM

## 2024-11-01 DIAGNOSIS — R00.2 PALPITATIONS: ICD-10-CM

## 2024-11-01 DIAGNOSIS — F32.A ANXIETY AND DEPRESSION: ICD-10-CM

## 2024-11-01 DIAGNOSIS — R03.0 ELEVATED BP WITHOUT DIAGNOSIS OF HYPERTENSION: Primary | ICD-10-CM

## 2024-11-01 DIAGNOSIS — E78.5 HYPERLIPIDEMIA, UNSPECIFIED HYPERLIPIDEMIA TYPE: ICD-10-CM

## 2024-11-01 DIAGNOSIS — F41.9 ANXIETY AND DEPRESSION: ICD-10-CM

## 2024-11-01 PROCEDURE — 99214 OFFICE O/P EST MOD 30 MIN: CPT | Performed by: FAMILY MEDICINE

## 2024-11-01 PROCEDURE — 1123F ACP DISCUSS/DSCN MKR DOCD: CPT | Performed by: FAMILY MEDICINE

## 2024-11-01 PROCEDURE — 1160F RVW MEDS BY RX/DR IN RCRD: CPT | Performed by: FAMILY MEDICINE

## 2024-11-01 PROCEDURE — 1159F MED LIST DOCD IN RCRD: CPT | Performed by: FAMILY MEDICINE

## 2024-11-01 PROCEDURE — 1036F TOBACCO NON-USER: CPT | Performed by: FAMILY MEDICINE

## 2024-11-01 ASSESSMENT — PATIENT HEALTH QUESTIONNAIRE - PHQ9
9. THOUGHTS THAT YOU WOULD BE BETTER OFF DEAD, OR OF HURTING YOURSELF: NOT AT ALL
4. FEELING TIRED OR HAVING LITTLE ENERGY: NOT AT ALL
1. LITTLE INTEREST OR PLEASURE IN DOING THINGS: NOT AT ALL
6. FEELING BAD ABOUT YOURSELF - OR THAT YOU ARE A FAILURE OR HAVE LET YOURSELF OR YOUR FAMILY DOWN: NOT AT ALL
SUM OF ALL RESPONSES TO PHQ QUESTIONS 1-9: 0
2. FEELING DOWN, DEPRESSED OR HOPELESS: NOT AT ALL
8. MOVING OR SPEAKING SO SLOWLY THAT OTHER PEOPLE COULD HAVE NOTICED. OR THE OPPOSITE, BEING SO FIGETY OR RESTLESS THAT YOU HAVE BEEN MOVING AROUND A LOT MORE THAN USUAL: NOT AT ALL
SUM OF ALL RESPONSES TO PHQ9 QUESTIONS 1 AND 2: 0
5. POOR APPETITE OR OVEREATING: NOT AT ALL
3. TROUBLE FALLING OR STAYING ASLEEP OR SLEEPING TOO MUCH: NOT AT ALL
10. IF YOU CHECKED OFF ANY PROBLEMS, HOW DIFFICULT HAVE THESE PROBLEMS MADE IT FOR YOU TO DO YOUR WORK, TAKE CARE OF THINGS AT HOME, OR GET ALONG WITH OTHER PEOPLE: NOT DIFFICULT AT ALL
7. TROUBLE CONCENTRATING ON THINGS, SUCH AS READING THE NEWSPAPER OR WATCHING TELEVISION: NOT AT ALL

## 2024-11-01 ASSESSMENT — ANXIETY QUESTIONNAIRES
5. BEING SO RESTLESS THAT IT IS HARD TO SIT STILL: NOT AT ALL
IF YOU CHECKED OFF ANY PROBLEMS ON THIS QUESTIONNAIRE, HOW DIFFICULT HAVE THESE PROBLEMS MADE IT FOR YOU TO DO YOUR WORK, TAKE CARE OF THINGS AT HOME, OR GET ALONG WITH OTHER PEOPLE: NOT DIFFICULT AT ALL
2. NOT BEING ABLE TO STOP OR CONTROL WORRYING: SEVERAL DAYS
GAD7 TOTAL SCORE: 2
7. FEELING AFRAID AS IF SOMETHING AWFUL MIGHT HAPPEN: NOT AT ALL
6. BECOMING EASILY ANNOYED OR IRRITABLE: NOT AT ALL
4. TROUBLE RELAXING: NOT AT ALL
1. FEELING NERVOUS, ANXIOUS, OR ON EDGE: SEVERAL DAYS
3. WORRYING TOO MUCH ABOUT DIFFERENT THINGS: NOT AT ALL

## 2024-12-23 ENCOUNTER — APPOINTMENT (OUTPATIENT)
Dept: PRIMARY CARE | Facility: CLINIC | Age: 69
End: 2024-12-23
Payer: MEDICARE

## 2024-12-23 VITALS — SYSTOLIC BLOOD PRESSURE: 144 MMHG | DIASTOLIC BLOOD PRESSURE: 80 MMHG

## 2025-02-27 ENCOUNTER — HOSPITAL ENCOUNTER (EMERGENCY)
Age: 70
Discharge: HOME | End: 2025-02-27
Payer: MEDICARE

## 2025-02-27 VITALS
OXYGEN SATURATION: 97 % | HEART RATE: 96 BPM | SYSTOLIC BLOOD PRESSURE: 159 MMHG | TEMPERATURE: 96.98 F | DIASTOLIC BLOOD PRESSURE: 95 MMHG | RESPIRATION RATE: 16 BRPM

## 2025-02-27 VITALS — OXYGEN SATURATION: 100 % | HEART RATE: 97 BPM | TEMPERATURE: 97.7 F | RESPIRATION RATE: 18 BRPM

## 2025-02-27 VITALS — SYSTOLIC BLOOD PRESSURE: 169 MMHG | DIASTOLIC BLOOD PRESSURE: 101 MMHG

## 2025-02-27 DIAGNOSIS — F32.A: ICD-10-CM
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
## 2025-02-27 DIAGNOSIS — Z79.899: ICD-10-CM

## 2025-02-27 DIAGNOSIS — F41.9: ICD-10-CM

## 2025-02-27 DIAGNOSIS — Z23: ICD-10-CM

## 2025-02-27 DIAGNOSIS — S01.01XA: Primary | ICD-10-CM

## 2025-02-27 PROCEDURE — 12004 RPR S/N/AX/GEN/TRK7.6-12.5CM: CPT

## 2025-02-27 PROCEDURE — 70450 CT HEAD/BRAIN W/O DYE: CPT

## 2025-02-27 PROCEDURE — 90715 TDAP VACCINE 7 YRS/> IM: CPT

## 2025-02-27 PROCEDURE — 90471 IMMUNIZATION ADMIN: CPT

## 2025-02-27 PROCEDURE — 99285 EMERGENCY DEPT VISIT HI MDM: CPT

## 2025-02-27 NOTE — CT_ITS
EXAM:  
CT head without contrast.  
   
CLINICAL HISTORY:  
Injury.  
   
COMPARISON:  
None.  
   
TECHNIQUE:  
CT of the head was performed without intravenous contrast.  Multiplanar 
reformats were obtained afterwards.  
   
FINDINGS:  
There is no acute intracranial hemorrhage, mass effect or midline shift.  There 
are no abnormal extra-axial fluid collections  
present.  The ventricles and sulci are within normal limits.  The calvarium is 
intact.  
   
Polyps/mucosal retention cysts within the bilateral maxillary sinuses.  
   
Mild soft tissue swelling and subcutaneous emphysema seen overlying the right 
frontal bone.  
   
ORDER #: [id number] CT/Brain/Head without Contrast  
IMPRESSION:  
Mild soft tissue swelling, subcutaneous emphysema overlying the right frontal b
one.  Correlate clinically for site of injury.  
There is no acute intracranial hemorrhage, mass effect or midline shift.  
   
Electronically Signed By: Jordan Crisostomo on 02/27/2025 06:20  
Reading Location: [id number]

## 2025-02-27 NOTE — EX.ED.GENINJ
HPI
History of Present Illness
Chief Complaint: Fall
Informant: patient
Narrative
Narrative: 
Brought by EMS from home fall off his bed hitting dresser hand no laceration of scalp.  Pain around the head region.  No anticoagulants.  Tetanus unknown.  No other injuries.
Tetanus Immunization: Unknown
Prior similar symptoms: No

PFSH
PFSH
Medical History (Reviewed 02/27/25 @ 05:29 by Dr. Pete Miranda DO)

Depression
Anxiety


Home Medications

?Medication ?Instructions ?Recorded ?Last Taken ?Type
aripiprazole 5 mg tablet (Abilify) 5 mg PO DAILY 02/27/25 Unknown History
venlafaxine 100 mg tablet 100 mg PO DAILY 02/27/25 Unknown History




Allergy/AdvReac Type Severity Reaction Status Date / Time
No Known Allergies Allergy   Verified 02/27/25 05:21


Surgical History (Reviewed 02/27/25 @ 05:29 by Dr. Pete Miranda DO)

History of ankle surgery


Social History (Reviewed 02/27/25 @ 05:29 by Dr. Pete Miranda DO)
Smoking Status:  Never smoker 



ROS
ROS ED
Constitutional
Constitutional ED: Denies chills, fever(s) or sweats
ENT
ENT ED: Denies sore throat
Cardiovascular
Cardiovascular: Denies chest pain, leg edema, palpitations or racing heartbeat
Respiratory/Chest
Respiratory/Chest: Denies cough, dyspnea or dyspnea on exertion
Gastrointestinal
Gastrointestinal: Denies abdominal pain, diarrhea, nausea or vomiting
Genitourinary
Genitourinary ED: Denies dysuria, hematuria or urinary frequency
Musculoskeletal
Musculoskeletal: Denies back pain, extremity pain or neck pain
Integumentary
Reports wounds; Denies rash
Neurologic
Neurologic: Reports headache(s); Denies paresthesias or weakness

EXAM
Physical Exam
Const
Vital Signs: 



 02/27/25
05:18 02/27/25
05:18 02/27/25
05:21
Temperature 97.7 F L  
Temperature Source Oral  
Pulse Rate 97  
Respiratory Rate 18  
Respiratory Effort  Normal
Non-Labored 
Respiratory Depth  Normal 
Respiratory Pattern  Normal 
Blood Pressure   169/101 H
Blood Pressure Mean   123
Pulse Ox 100  
Oxygen Delivery Method Room Air Room Air 

 02/27/25
07:51
Temperature 97.0 F L
Temperature Source 
Pulse Rate 96
Respiratory Rate 16
Respiratory Effort 
Respiratory Depth 
Respiratory Pattern 
Blood Pressure 159/95 H
Blood Pressure Mean 116
Pulse Ox 97
Oxygen Delivery Method 



Positive well nourished and well developed
Constitutional Narrative: 
GCS 15.
General Appearance ED: well developed and NAD
HEENT
Reports moist mucous membranes
HEENT Narrative: 
Dressing around the head including forehead.  Dressing taken down noted V flap laceration mid forehead total length 9 cm.  Bleeding controlled with pressure.
normocephalic
Eyes
General Eye ED: Yes normal appearance of both eyes
Neck
full ROM
Neck Narrative: 
No midline tenderness or step-offs.
Chest Wall
inspection of chest normal and palpation of chest normal
Chest: Negative for tenderness
Resp
normal respiratory effort and normal air movement
Effort and Inspection: symmetric chest movement; Negative for respiratory distress
Cardio
regular rate, regular rhythm and no murmurs
Peripheral Pulses: pulses 2+ throughout
GI
normal to inspection, nondistended, normoactive bowel sounds and non-tender
Palpation: Negative for guarding or rebound tenderness present
Back/Spine
Back/Spine Narrative: 
No midline thoracic or lumbar tenderness.
Extremity
normal to inspection
Extremity Narrative: 
Full range of motion all 4 extremities.  Nontender.  Leg with logroll of the lower extremities.  Pulses are intact distally.
General Extremety ED: Negative for edema or tenderness
General Extremity: Negative for edema
Neuro
oriented x3, CN's II-XII intact bilaterally and no sensory deficits noted
Sensorium / Orientation: awake and alert
Skin
no rashes or lesions noted and no wounds

MDM
MDM
MDM Narrative
Medical decision making narrative: 
Interventions / MDM: 

Differential diagnosis: Scalp laceration, head injury

Diagnosis considered but do not suspect: Fracture/intracranial hemorrhage however CT negative.

My EKG interpretation: N/A

Imaging independently reviewed and interpreted by myself: CT brain: No fracture no intracranial hemorrhage, subcutaneous emphysema overlying the right frontal bone.

External documents reviewed: N/A

Test considered but not ordered:N/A

ED course: Tetanus updated in ED.  Head injury scalp laceration.  Trauma scans head obtained.  Tetanus updated.  Will prep for laceration repair.

Trauma scan negative.  Scalp laceration thoroughly cleansed after local anesthetic with normal saline.  Noted scalp avulsion with the laceration.  This was repaired using a total eighteen 5-0 nylon sutures.  Wound care discussed with the patient.  
Sutures removed in 10 to 14 days by his PCP.  All questions were answered.


Procedure note: Verbal consent.  Normal sterile conditions.  Clean the wound, noted avulsion of the flap of the laceration.  8 cc 1% lidocaine without epinephrine used for local analgesia.  Copious flushing 250 cc normal saline.  Initial corner 
stitch was placed using 5-0 stitch to approximate the corner mid wound.  Additional 4, 5-0 simple interrupted abdominal sutures placed to the left side of the wound with good approximation.  Additional 7,5-0 simple interrupted nylon sutures placed 
on the right side of the wound good approximation.  Bacitracin placed over the wound.  Patient tolerated procedure well.

Re-evaluation: stable 

Disposition discussed with patient/family/significant other: Patient

Case discussed with consulting clinician: N/A

This note was generated with Dragon dictation software. It may contain incorrect words, spelling, and punctuation that were not noted in checking the note before signing.
Radiography
Diagnostic Testing: 

Clinical Impression(s) from Imaging Studies

Brain CT  02/27/25 05:28
IMPRESSION:
Mild soft tissue swelling, subcutaneous emphysema overlying the right frontal bone.  Correlate clinically for site of injury.
There is no acute intracranial hemorrhage, mass effect or midline shift.
 
Electronically Signed By: Jordan Crisostomo on 02/27/2025 06:20
[facility]





Discharge Plan
Triage
Chief Complaint: Fall

ED Provider: Pete Miranda

Dx/Rx/DC Orders
Clinical Impression:
 Laceration of skin of scalp, CHI (closed head injury), Tetanus toxoid vaccination administered at current visit


Instructions:  ED Head Injury (Adult), ED Laceration Scalp Stitches or Staples

Prescriptions:
No Action
  aripiprazole [Abilify] 5 mg tablet 
   5 mg PO DAILY 
  venlafaxine 100 mg tablet 
   100 mg PO DAILY 

Primary Care Provider: Care Physician,No Primary

Referrals:
[facility] Doctor,Out of [Non-Staff] - 

Activity Restrictions/Additional Instructions:
CT scan of your brain negative for fracture or intracranial hemorrhage.  Your tetanus was updated.  You had a total of 12 stitches placed to fix your laceration.  Follow-up your doctor for suture removal in 10 to 14 days.  Wound care as discussed.

Print Language: English

Disposition
***Disposition***: Home, Self Care

Discharge Date/Time: 02/27/25 07:53

## 2025-02-27 NOTE — EDS_ITS
HPI    
History of Present Illness    
Chief Complaint: Fall    
Informant: patient    
Narrative    
Narrative:     
Brought by EMS from home fall off his bed hitting dresser hand no laceration of   
scalp.  Pain around the head region.  No anticoagulants.  Tetanus unknown.  No   
other injuries.    
Tetanus Immunization: Unknown    
Prior similar symptoms: No    
    
PFSH    
PFSH    
Medical History (Reviewed 02/27/25 @ 05:29 by Dr. Pete Miranda DO)    
    
Depression    
Anxiety    
    
    
                                Home Medications    
    
    
    
?Medication ?Instructions ?Recorded ?Last Taken ?Type    
     
                          aripiprazole 5 mg tablet (Abilify) 5 mg PO DAILY 02/27 /25 Unknown History    
     
                          venlafaxine 100 mg tablet 100 mg PO DAILY 02/27/25 Unk    
nown History    
    
    
    
                                            
    
    
    
Allergy/AdvReac Type Severity Reaction Status Date / Time    
     
No Known Allergies Allergy   Verified 02/27/25 05:21    
    
    
    
Surgical History (Reviewed 02/27/25 @ 05:29 by Dr. Pete Miranda DO)    
    
History of ankle surgery    
    
    
Social History (Reviewed 02/27/25 @ 05:29 by Dr. Pete Miranda DO)    
Smoking Status:  Never smoker     
    
    
    
ROS    
ROS ED    
Constitutional    
Constitutional ED: Denies chills, fever(s) or sweats    
ENT    
ENT ED: Denies sore throat    
Cardiovascular    
Cardiovascular: Denies chest pain, leg edema, palpitations or racing heartbeat    
Respiratory/Chest    
Respiratory/Chest: Denies cough, dyspnea or dyspnea on exertion    
Gastrointestinal    
Gastrointestinal: Denies abdominal pain, diarrhea, nausea or vomiting    
Genitourinary    
Genitourinary ED: Denies dysuria, hematuria or urinary frequency    
Musculoskeletal    
Musculoskeletal: Denies back pain, extremity pain or neck pain    
Integumentary    
Reports wounds; Denies rash    
Neurologic    
Neurologic: Reports headache(s); Denies paresthesias or weakness    
    
EXAM    
Physical Exam    
Const    
Vital Signs:     
    
                                            
    
    
    
 02/27/25    
05:18 02/27/25    
05:18 02/27/25    
05:21    
     
Temperature 97.7 F L      
     
Temperature Source Oral      
     
Pulse Rate 97      
     
Respiratory Rate 18      
     
Respiratory Effort  Normal    
Non-Labored     
     
Respiratory Depth  Normal     
     
Respiratory Pattern  Normal     
     
Blood Pressure   169/101 H    
     
Blood Pressure Mean   123    
     
Pulse Ox 100      
     
Oxygen Delivery Method Room Air Room Air     
    
    
    
    
 02/27/25    
07:51    
     
Temperature 97.0 F L    
     
Temperature Source     
     
Pulse Rate 96    
     
Respiratory Rate 16    
     
Respiratory Effort     
     
Respiratory Depth     
     
Respiratory Pattern     
     
Blood Pressure 159/95 H    
     
Blood Pressure Mean 116    
     
Pulse Ox 97    
     
Oxygen Delivery Method     
    
    
    
    
Positive well nourished and well developed    
Constitutional Narrative:     
GCS 15.    
General Appearance ED: well developed and NAD    
HEENT    
Reports moist mucous membranes    
HEENT Narrative:     
Dressing around the head including forehead.  Dressing taken down noted V flap   
laceration mid forehead total length 9 cm.  Bleeding controlled with pressure.    
normocephalic    
Eyes    
General Eye ED: Yes normal appearance of both eyes    
Neck    
full ROM    
Neck Narrative:     
No midline tenderness or step-offs.    
Chest Wall    
inspection of chest normal and palpation of chest normal    
Chest: Negative for tenderness    
Resp    
normal respiratory effort and normal air movement    
Effort and Inspection: symmetric chest movement; Negative for respiratory   
distress    
Cardio    
regular rate, regular rhythm and no murmurs    
Peripheral Pulses: pulses 2+ throughout    
GI    
normal to inspection, nondistended, normoactive bowel sounds and non-tender    
Palpation: Negative for guarding or rebound tenderness present    
Back/Spine    
Back/Spine Narrative:     
No midline thoracic or lumbar tenderness.    
Extremity    
normal to inspection    
Extremity Narrative:     
Full range of motion all 4 extremities.  Nontender.  Leg with logroll of the   
lower extremities.  Pulses are intact distally.    
General Extremety ED: Negative for edema or tenderness    
General Extremity: Negative for edema    
Neuro    
oriented x3, CN's II-XII intact bilaterally and no sensory deficits noted    
Sensorium / Orientation: awake and alert    
Skin    
no rashes or lesions noted and no wounds    
    
MDM    
MDM    
MDM Narrative    
Medical decision making narrative:     
Interventions / MDM:     
    
Differential diagnosis: Scalp laceration, head injury    
    
Diagnosis considered but do not suspect: Fracture/intracranial hemorrhage   
however CT negative.    
    
My EKG interpretation: N/A    
    
Imaging independently reviewed and interpreted by myself: CT brain: No fracture   
no intracranial hemorrhage, subcutaneous emphysema overlying the right frontal   
bone.    
    
External documents reviewed: N/A    
    
Test considered but not ordered:N/A    
    
ED course: Tetanus updated in ED.  Head injury scalp laceration.  Trauma scans   
head obtained.  Tetanus updated.  Will prep for laceration repair.    
    
Trauma scan negative.  Scalp laceration thoroughly cleansed after local   
anesthetic with normal saline.  Noted scalp avulsion with the laceration.  This   
was repaired using a total eighteen 5-0 nylon sutures.  Wound care discussed   
with the patient.  Sutures removed in 10 to 14 days by his PCP.  All questions   
were answered.    
    
    
Procedure note: Verbal consent.  Normal sterile conditions.  Clean the wound,   
noted avulsion of the flap of the laceration.  8 cc 1% lidocaine without   
epinephrine used for local analgesia.  Copious flushing 250 cc normal saline.    
Initial corner stitch was placed using 5-0 stitch to approximate the corner mid   
wound.  Additional 4, 5-0 simple interrupted abdominal sutures placed to the   
left side of the wound with good approximation.  Additional 7,5-0 simple   
interrupted nylon sutures placed on the right side of the wound good   
approximation.  Bacitracin placed over the wound.  Patient tolerated procedure   
well.    
    
Re-evaluation: stable     
    
Disposition discussed with patient/family/significant other: Patient    
    
Case discussed with consulting clinician: N/A    
    
This note was generated with Dragon dictation software. It may contain incorrect  
words, spelling, and punctuation that were not noted in checking the note before  
signing.    
Radiography    
Diagnostic Testing:     
    
Clinical Impression(s) from Imaging Studies    
    
Brain CT  02/27/25 05:28    
IMPRESSION:    
Mild soft tissue swelling, subcutaneous emphysema overlying the right frontal   
bone.  Correlate clinically for site of injury.    
There is no acute intracranial hemorrhage, mass effect or midline shift.    
     
Electronically Signed By: Jordan Crisostomo on 02/27/2025 06:20    
[facility]    
    
    
    
    
    
Discharge Plan    
Triage    
Chief Complaint: Fall    
    
ED Provider: Pete Miranda    
    
Dx/Rx/DC Orders    
Clinical Impression:    
 Laceration of skin of scalp, CHI (closed head injury), Tetanus toxoid   
vaccination administered at current visit    
    
    
Instructions:  ED Head Injury (Adult), ED Laceration Scalp Stitches or Staples    
    
Prescriptions:    
No Action    
  aripiprazole [Abilify] 5 mg tablet     
   5 mg PO DAILY     
  venlafaxine 100 mg tablet     
   100 mg PO DAILY     
    
Primary Care Provider: Care Physician,No Primary    
    
Referrals:    
[facility] Doctor,Out of [Non-Staff] -     
    
Activity Restrictions/Additional Instructions:    
CT scan of your brain negative for fracture or intracranial hemorrhage.  Your   
tetanus was updated.  You had a total of 12 stitches placed to fix your   
laceration.  Follow-up your doctor for suture removal in 10 to 14 days.  Wound   
care as discussed.    
    
Print Language: English    
    
Disposition    
***Disposition***: Home, Self Care    
    
Discharge Date/Time: 02/27/25 07:53

## 2025-03-04 ENCOUNTER — TELEPHONE (OUTPATIENT)
Dept: PRIMARY CARE | Facility: CLINIC | Age: 70
End: 2025-03-04
Payer: MEDICARE

## 2025-03-05 ENCOUNTER — TELEPHONE (OUTPATIENT)
Dept: PRIMARY CARE | Facility: CLINIC | Age: 70
End: 2025-03-05
Payer: MEDICARE

## 2025-03-05 NOTE — PROGRESS NOTES
Subjective        Vincenzo Cummings. Is 69 y.o.. male. Here for follow up office visit-       No chief complaint on file.      HPI:      Dr De Paz pt        How is patient doing today?      Follow up for ED/UC/Hospital admission:  Date(s):  2/27/25          ED course:  HPI   History of Present Illness   Chief Complaint: Fall   Informant: patient   Narrative   Narrative:   Brought by EMS from home fall off his bed hitting dresser hand no laceration of scalp. Pain around   the head region. No anticoagulants. Tetanus unknown. No other injuries.   Tetanus Immunization: Unknown   Prior similar symptoms: No     Exam-   flap laceration mid   forehead total length 9 cm. Bleeding controlled with pressure.     ED course: Tetanus updated in ED. Head injury scalp laceration.         Dx:  Laceration - scalp  Head injury     Head CT-  There is no acute intracranial hemorrhage, mass effect or midline shift.   Mild soft tissue swelling, subcutaneous emphysema overlying the right frontal bone.             The patient is here for a hospital follow up.  Hospital records were reviewed prior to visit, including relevant labs, imaging findings, consultant notes, and discharge summary.  Medications were reconciled and are current, reviewed today.       Lab on 08/01/2024   Component Date Value Ref Range Status    WBC 08/01/2024 4.6  4.4 - 11.3 x10*3/uL Final    nRBC 08/01/2024 0.0  0.0 - 0.0 /100 WBCs Final    RBC 08/01/2024 4.95  4.50 - 5.90 x10*6/uL Final    Hemoglobin 08/01/2024 16.2  13.5 - 17.5 g/dL Final    Hematocrit 08/01/2024 47.3  41.0 - 52.0 % Final    MCV 08/01/2024 96  80 - 100 fL Final    MCH 08/01/2024 32.7  26.0 - 34.0 pg Final    MCHC 08/01/2024 34.2  32.0 - 36.0 g/dL Final    RDW 08/01/2024 12.2  11.5 - 14.5 % Final    Platelets 08/01/2024 281  150 - 450 x10*3/uL Final    Glucose 08/01/2024 107 (H)  74 - 99 mg/dL Final    Sodium 08/01/2024 140  136 - 145 mmol/L Final    Potassium 08/01/2024 4.7  3.5 - 5.3 mmol/L Final     Chloride 08/01/2024 103  98 - 107 mmol/L Final    Bicarbonate 08/01/2024 30  21 - 32 mmol/L Final    Anion Gap 08/01/2024 12  10 - 20 mmol/L Final    Urea Nitrogen 08/01/2024 12  6 - 23 mg/dL Final    Creatinine 08/01/2024 0.84  0.50 - 1.30 mg/dL Final    eGFR 08/01/2024 >90  >60 mL/min/1.73m*2 Final    Calculations of estimated GFR are performed using the 2021 CKD-EPI Study Refit equation without the race variable for the IDMS-Traceable creatinine methods.  https://jasn.asnjournals.org/content/early/2021/09/22/ASN.2237191673    Calcium 08/01/2024 9.9  8.6 - 10.6 mg/dL Final    Albumin 08/01/2024 4.8  3.4 - 5.0 g/dL Final    Alkaline Phosphatase 08/01/2024 54  33 - 136 U/L Final    Total Protein 08/01/2024 7.2  6.4 - 8.2 g/dL Final    AST 08/01/2024 18  9 - 39 U/L Final    Bilirubin, Total 08/01/2024 0.8  0.0 - 1.2 mg/dL Final    ALT 08/01/2024 19  10 - 52 U/L Final    Patients treated with Sulfasalazine may generate falsely decreased results for ALT.    Cholesterol 08/01/2024 186  0 - 199 mg/dL Final          Age      Desirable   Borderline High   High     0-19 Y     0 - 169       170 - 199     >/= 200    20-24 Y     0 - 189       190 - 224     >/= 225         >24 Y     0 - 199       200 - 239     >/= 240   **All ranges are based on fasting samples. Specific   therapeutic targets will vary based on patient-specific   cardiac risk.    Pediatric guidelines reference:Pediatrics 2011, 128(S5).Adult guidelines reference: NCEP ATPIII Guidelines,ESTRELLA 2001, 258:2486-97    Venipuncture immediately after or during the administration of Metamizole may lead to falsely low results. Testing should be performed immediately prior to Metamizole dosing.    HDL-Cholesterol 08/01/2024 76.0  mg/dL Final      Age       Very Low   Low     Normal    High    0-19 Y    < 35      < 40     40-45     ----  20-24 Y    ----     < 40      >45      ----        >24 Y      ----     < 40     40-60      >60      Cholesterol/HDL Ratio 08/01/2024 2.4    Final      Ref Values  Desirable  < 3.4  High Risk  > 5.0    LDL Calculated 08/01/2024 92  <=99 mg/dL Final                                Near   Borderline      AGE      Desirable  Optimal    High     High     Very High     0-19 Y     0 - 109     ---    110-129   >/= 130     ----    20-24 Y     0 - 119     ---    120-159   >/= 160     ----      >24 Y     0 -  99   100-129  130-159   160-189     >/=190      VLDL 08/01/2024 18  0 - 40 mg/dL Final    Triglycerides 08/01/2024 92  0 - 149 mg/dL Final       Age         Desirable   Borderline High   High     Very High   0 D-90 D    19 - 174         ----         ----        ----  91 D- 9 Y     0 -  74        75 -  99     >/= 100      ----    10-19 Y     0 -  89        90 - 129     >/= 130      ----    20-24 Y     0 - 114       115 - 149     >/= 150      ----         >24 Y     0 - 149       150 - 199    200- 499    >/= 500    Venipuncture immediately after or during the administration of Metamizole may lead to falsely low results. Testing should be performed immediately prior to Metamizole dosing.    Non HDL Cholesterol 08/01/2024 110  0 - 149 mg/dL Final          Age       Desirable   Borderline High   High     Very High     0-19 Y     0 - 119       120 - 144     >/= 145    >/= 160    20-24 Y     0 - 149       150 - 189     >/= 190      ----         >24 Y    30 mg/dL above LDL Cholesterol goal      Prostate Specific Antigen,Screen 08/01/2024 0.85  <=4.00 ng/mL Final           Patient Active Problem List    Diagnosis Date Noted    Arthritis 11/01/2024    Palpitations 11/01/2024    Benign tubular adenoma of large intestine 08/01/2024    Varicose veins of lower extremity 08/01/2024    Acid reflux 08/01/2023    Elevated BP without diagnosis of hypertension 08/01/2023    HLD (hyperlipidemia) 08/01/2023    Epidermoid cyst 08/01/2023    Glaucoma 08/01/2023    Varicose veins of legs 08/01/2023    Vitamin D deficiency 08/01/2023    Medicare annual wellness visit, subsequent  08/01/2023    Anxiety and depression 06/22/2023           Reviewed JOSÉ note:    No data recorded      Patient Care Team:  Nu De Paz DO as PCP - General (Family Medicine)      The following portions of the patient's chart were reviewed in this encounter and updated as appropriate:  Tobacco  Allergies  Meds  Problems  Med Hx  Surg Hx  Fam Hx     REVIEW OF SYSTEMS:        Objective      There were no vitals filed for this visit.    PHYSICAL:      Patient is alert and oriented x 3 , NAD  HEAD- normocephalic and atraumatic   EYES-conjunctiva-normal, lids - normal  EARS/NOSE- normal external exam   NECK-supple,FROM  CV- RRR without murmur  PULM- CTA bilaterally, normal respiratory effort  RESPIRATORY EFFORT- normal , no retractions or nasal flaring   ABD- normoactive BS's   EXT- no edema,NT  SKIN- no abnormal skin lesions noted  NEURO- no focal deficits  PSYCH- pleasant, normal judgement and insight      BP Readings from Last 3 Encounters:   12/23/24 144/80   11/01/24 (!) 148/91   08/01/24 146/87         Wt Readings from Last 3 Encounters:   11/01/24 69.7 kg (153 lb 9.6 oz)   08/01/24 68.8 kg (151 lb 9.6 oz)   08/01/23 67.2 kg (148 lb 1.6 oz)       BMI Readings from Last 3 Encounters:   11/01/24 23.70 kg/m²   08/01/24 23.39 kg/m²   08/01/23 22.85 kg/m²           The number and complexity of problems addressed is considered moderate.  The amount and/or complexity of data reviewed and analyzed is considered moderate. The risk of complications and/or morbidity/mortality of patient is considered moderate. Overall, this patient encounter is considered a moderate risk visit.    The patient is here for a hospital follow up.  Hospital records were reviewed prior to visit, including relevant labs, imaging findings, consultant notes, and discharge summary.  Medications were reconciled and are current, reviewed today.    Time spent reviewing hospital records prior to today's face-to-face office visit=          minutes    Initial JOSÉ note reviewed.      Today's face-to-face office visit is occurring within   -         -    days of discharge.      If appropriate - referrals placed,  home health care arranged        if needed - community resources discussed with patient/caregiver -such as  Assisted Living, Hospice, Support Groups        if appropriate- Durable Medical Equipment -     see DME orders        Additional communication delivered or planned-           Patient education provided when applicable.             Assessment/Plan      Assessment & Plan  Hospital discharge follow-up         Laceration of scalp, initial encounter         Injury of head, initial encounter                       Current Outpatient Medications:     ARIPiprazole (Abilify) 5 mg tablet, TAKE 1 TABLET BY MOUTH EVERY DAY, Disp: 90 tablet, Rfl: 3    latanoprost (Xalatan) 0.005 % ophthalmic solution, INSTILL 1 DROP IN BOTH EYES ONCE DAILY AT BEDTIME., Disp: , Rfl:     omega 3-dha-epa-fish oil (Fish OiL) 1,000 mg (120 mg-180 mg) capsule, Take by mouth., Disp: , Rfl:     venlafaxine XR (Effexor-XR) 150 mg 24 hr capsule, TAKE 1 CAPSULE BY MOUTH EVERY DAY, Disp: 90 capsule, Rfl: 3      Ordered lab      Follow up in      Time spent during the office visit includes-    updating and familiarizing myself with patients past medical history, social history, and allergies :  discussing ROS ;  obtaining direct  chief complaint and history of present illness from patient ,  reviewing and reconciling current medication list - both prescription and over the counter medications    clinically examine patient    determine what testing if any is needed to  diagnose the condition/conditions  with which patient is presenting    using medical knowledge to put all of the information together and decide on best course of action to proceed with diagnosis  and  treatment for the presenting problem or problems    Pre-visit planning, chart review, and face-to-face  encounter   Review of urgent care ?Hospital records (receicved before and/or after visit,  or same day)  Discussion of medications  Coordination with patient  and /or office staff  for med adjustments   Final documentation of visit        My total time spent caring for the patient for the day of the encounter (before,during, and after) was =     >30      total minutes.      (Prep+during visit+post visit)

## 2025-03-11 ENCOUNTER — APPOINTMENT (OUTPATIENT)
Dept: PRIMARY CARE | Facility: CLINIC | Age: 70
End: 2025-03-11
Payer: MEDICARE

## 2025-03-11 VITALS
DIASTOLIC BLOOD PRESSURE: 83 MMHG | BODY MASS INDEX: 23.38 KG/M2 | TEMPERATURE: 97.8 F | OXYGEN SATURATION: 98 % | HEART RATE: 75 BPM | RESPIRATION RATE: 12 BRPM | WEIGHT: 151.5 LBS | SYSTOLIC BLOOD PRESSURE: 162 MMHG

## 2025-03-11 DIAGNOSIS — S01.01XA LACERATION OF SCALP, INITIAL ENCOUNTER: ICD-10-CM

## 2025-03-11 DIAGNOSIS — S09.90XA INJURY OF HEAD, INITIAL ENCOUNTER: ICD-10-CM

## 2025-03-11 DIAGNOSIS — Z09 HOSPITAL DISCHARGE FOLLOW-UP: Primary | ICD-10-CM

## 2025-03-11 PROCEDURE — 1160F RVW MEDS BY RX/DR IN RCRD: CPT | Performed by: FAMILY MEDICINE

## 2025-03-11 PROCEDURE — 1159F MED LIST DOCD IN RCRD: CPT | Performed by: FAMILY MEDICINE

## 2025-03-11 PROCEDURE — 99214 OFFICE O/P EST MOD 30 MIN: CPT | Performed by: FAMILY MEDICINE

## 2025-03-11 PROCEDURE — 1036F TOBACCO NON-USER: CPT | Performed by: FAMILY MEDICINE

## 2025-03-11 PROCEDURE — 1123F ACP DISCUSS/DSCN MKR DOCD: CPT | Performed by: FAMILY MEDICINE

## 2025-05-07 ENCOUNTER — APPOINTMENT (OUTPATIENT)
Dept: PRIMARY CARE | Facility: CLINIC | Age: 70
End: 2025-05-07
Payer: MEDICARE

## 2025-05-07 VITALS
SYSTOLIC BLOOD PRESSURE: 152 MMHG | HEIGHT: 68 IN | RESPIRATION RATE: 12 BRPM | BODY MASS INDEX: 23.14 KG/M2 | DIASTOLIC BLOOD PRESSURE: 80 MMHG | WEIGHT: 152.7 LBS | HEART RATE: 68 BPM | OXYGEN SATURATION: 98 %

## 2025-05-07 DIAGNOSIS — F41.9 ANXIETY: ICD-10-CM

## 2025-05-07 DIAGNOSIS — I10 BENIGN ESSENTIAL HYPERTENSION: Chronic | ICD-10-CM

## 2025-05-07 DIAGNOSIS — F33.0 MILD EPISODE OF RECURRENT MAJOR DEPRESSIVE DISORDER (CMS-HCC): ICD-10-CM

## 2025-05-07 DIAGNOSIS — Z13.89 SCREENING FOR MULTIPLE CONDITIONS: ICD-10-CM

## 2025-05-07 DIAGNOSIS — Z00.00 MEDICARE ANNUAL WELLNESS VISIT, SUBSEQUENT: Primary | ICD-10-CM

## 2025-05-07 DIAGNOSIS — R35.0 URINARY FREQUENCY: ICD-10-CM

## 2025-05-07 DIAGNOSIS — F32.9 MAJOR DEPRESSIVE DISORDER, REMISSION STATUS UNSPECIFIED, UNSPECIFIED WHETHER RECURRENT: ICD-10-CM

## 2025-05-07 DIAGNOSIS — E78.5 HYPERLIPIDEMIA, UNSPECIFIED HYPERLIPIDEMIA TYPE: ICD-10-CM

## 2025-05-07 DIAGNOSIS — E55.9 VITAMIN D DEFICIENCY: ICD-10-CM

## 2025-05-07 PROCEDURE — G0439 PPPS, SUBSEQ VISIT: HCPCS | Performed by: FAMILY MEDICINE

## 2025-05-07 PROCEDURE — 99397 PER PM REEVAL EST PAT 65+ YR: CPT | Performed by: FAMILY MEDICINE

## 2025-05-07 PROCEDURE — 1160F RVW MEDS BY RX/DR IN RCRD: CPT | Performed by: FAMILY MEDICINE

## 2025-05-07 PROCEDURE — 1170F FXNL STATUS ASSESSED: CPT | Performed by: FAMILY MEDICINE

## 2025-05-07 PROCEDURE — G0444 DEPRESSION SCREEN ANNUAL: HCPCS | Performed by: FAMILY MEDICINE

## 2025-05-07 PROCEDURE — 1158F ADVNC CARE PLAN TLK DOCD: CPT | Performed by: FAMILY MEDICINE

## 2025-05-07 PROCEDURE — 3079F DIAST BP 80-89 MM HG: CPT | Performed by: FAMILY MEDICINE

## 2025-05-07 PROCEDURE — 99213 OFFICE O/P EST LOW 20 MIN: CPT | Performed by: FAMILY MEDICINE

## 2025-05-07 PROCEDURE — 1036F TOBACCO NON-USER: CPT | Performed by: FAMILY MEDICINE

## 2025-05-07 PROCEDURE — 1159F MED LIST DOCD IN RCRD: CPT | Performed by: FAMILY MEDICINE

## 2025-05-07 PROCEDURE — 3008F BODY MASS INDEX DOCD: CPT | Performed by: FAMILY MEDICINE

## 2025-05-07 PROCEDURE — 3075F SYST BP GE 130 - 139MM HG: CPT | Performed by: FAMILY MEDICINE

## 2025-05-07 RX ORDER — LISINOPRIL 10 MG/1
10 TABLET ORAL DAILY
Qty: 100 TABLET | Refills: 1 | Status: SHIPPED | OUTPATIENT
Start: 2025-05-07 | End: 2026-06-11

## 2025-05-07 RX ORDER — ARIPIPRAZOLE 5 MG/1
5 TABLET ORAL DAILY
Qty: 90 TABLET | Refills: 3 | Status: SHIPPED | OUTPATIENT
Start: 2025-05-07

## 2025-05-07 RX ORDER — VENLAFAXINE HYDROCHLORIDE 150 MG/1
150 CAPSULE, EXTENDED RELEASE ORAL DAILY
Qty: 90 CAPSULE | Refills: 3 | Status: SHIPPED | OUTPATIENT
Start: 2025-05-07

## 2025-05-07 ASSESSMENT — ACTIVITIES OF DAILY LIVING (ADL)
BATHING: INDEPENDENT
TAKING_MEDICATION: INDEPENDENT
DOING_HOUSEWORK: INDEPENDENT
GROCERY_SHOPPING: INDEPENDENT
DRESSING: INDEPENDENT
MANAGING_FINANCES: INDEPENDENT

## 2025-05-07 ASSESSMENT — ANXIETY QUESTIONNAIRES
4. TROUBLE RELAXING: NOT AT ALL
IF YOU CHECKED OFF ANY PROBLEMS ON THIS QUESTIONNAIRE, HOW DIFFICULT HAVE THESE PROBLEMS MADE IT FOR YOU TO DO YOUR WORK, TAKE CARE OF THINGS AT HOME, OR GET ALONG WITH OTHER PEOPLE: NOT DIFFICULT AT ALL
6. BECOMING EASILY ANNOYED OR IRRITABLE: NOT AT ALL
7. FEELING AFRAID AS IF SOMETHING AWFUL MIGHT HAPPEN: NOT AT ALL
2. NOT BEING ABLE TO STOP OR CONTROL WORRYING: NOT AT ALL
GAD7 TOTAL SCORE: 2
3. WORRYING TOO MUCH ABOUT DIFFERENT THINGS: SEVERAL DAYS
5. BEING SO RESTLESS THAT IT IS HARD TO SIT STILL: NOT AT ALL
1. FEELING NERVOUS, ANXIOUS, OR ON EDGE: SEVERAL DAYS

## 2025-05-07 ASSESSMENT — ENCOUNTER SYMPTOMS
DEPRESSION: 0
LOSS OF SENSATION IN FEET: 0
OCCASIONAL FEELINGS OF UNSTEADINESS: 0

## 2025-05-07 ASSESSMENT — PATIENT HEALTH QUESTIONNAIRE - PHQ9
10. IF YOU CHECKED OFF ANY PROBLEMS, HOW DIFFICULT HAVE THESE PROBLEMS MADE IT FOR YOU TO DO YOUR WORK, TAKE CARE OF THINGS AT HOME, OR GET ALONG WITH OTHER PEOPLE: NOT DIFFICULT AT ALL
1. LITTLE INTEREST OR PLEASURE IN DOING THINGS: NOT AT ALL
7. TROUBLE CONCENTRATING ON THINGS, SUCH AS READING THE NEWSPAPER OR WATCHING TELEVISION: NOT AT ALL
SUM OF ALL RESPONSES TO PHQ QUESTIONS 1-9: 1
5. POOR APPETITE OR OVEREATING: NOT AT ALL
4. FEELING TIRED OR HAVING LITTLE ENERGY: NOT AT ALL
8. MOVING OR SPEAKING SO SLOWLY THAT OTHER PEOPLE COULD HAVE NOTICED. OR THE OPPOSITE, BEING SO FIGETY OR RESTLESS THAT YOU HAVE BEEN MOVING AROUND A LOT MORE THAN USUAL: NOT AT ALL
6. FEELING BAD ABOUT YOURSELF - OR THAT YOU ARE A FAILURE OR HAVE LET YOURSELF OR YOUR FAMILY DOWN: SEVERAL DAYS
SUM OF ALL RESPONSES TO PHQ9 QUESTIONS 1 AND 2: 0
2. FEELING DOWN, DEPRESSED OR HOPELESS: NOT AT ALL
3. TROUBLE FALLING OR STAYING ASLEEP OR SLEEPING TOO MUCH: NOT AT ALL
9. THOUGHTS THAT YOU WOULD BE BETTER OFF DEAD, OR OF HURTING YOURSELF: NOT AT ALL

## 2025-05-07 NOTE — PROGRESS NOTES
"Subjective   Reason for Visit: Vincenzo Cummings is an 69 y.o. male here for a Medicare Wellness visit. SVN transfer.    HPI  Diet: good appetite, well balanced  Supplements/vitamins:   Alcohol use: 1-3 drinks/week  Caffeine intake: 2-3x/week (coffee,tea)  Exercise: active, nothing formal, hiking  Sleep: interrupted by urinating   Last dental appointment: routinely   Last eye appointment: frequent, see below ()  Anxiety/Depression: see below  Cscope: 2024, repeat in 5-10 years  CT cardiac score: ordered 11/2024, not performed  Tobacco use/Lung cancer screening: n  Recreational drug use: n  Immunizations: due for 2nd pna  Derm: none (previously [facility])    Anxiety  PHQ-9: 9  ROLAND-7: 7  Taking venlafaxine and abilify for several years and doing well on them. Denies adverse effects. He is retired.  Recently moved and divorce finalized  2017 completed drug/alcohol rehab (alcohol)  No current counseling    Elevated blood pressure  Never diagnosed with HTN,but home Bps are averaging 150-160/70-90  Reports some vision changes, but is being closely monitored by an eye doctor for early glaucoma, using latanoprost (treated for 7-8 years)    Urinary frequency  Noticed he is waking up 2-4x/night, worse if drinking water before going to sleep  PSA level less than 1 and stable  Denies weakened stream, dysuria, hematuria  Drinks minimal to no caffeine    Patient Care Team:  Nu De Paz,  as PCP - General (Family Medicine)     Review of Systems   All other systems reviewed and are negative.    Objective   Vitals:  /86 (BP Location: Right arm, Patient Position: Sitting, BP Cuff Size: Adult)   Pulse 68   Resp 12   Ht 1.715 m (5' 7.5\")   Wt 69.3 kg (152 lb 11.2 oz)   SpO2 98%   BMI 23.56 kg/m²       Physical Exam  Constitutional: Well developed, well nourished, alert and in no acute distress.  Head and Face: Normocephalic, atraumatic.  Eyes: Normal external exam. Pupils equally round and reactive to " light with normal accommodation and extraocular movements intact.   ENT: External inspection of ears normal, tympanic membranes visualized and normal. Nasal mucosa, septum, and turbinates normal. Oral mucosa moist, oropharynx clear.   Neck: Supple, no lymphadenopathy or masses. Thyroid not enlarged, no palpable nodules.   Cardiovascular: Regular rate and rhythm, normal S1 and S2, no murmurs, gallops, or rubs. Radial pulses normal. No peripheral edema. No carotid bruits.   Pulmonary: No respiratory distress, lungs clear to auscultation bilaterally. No wheezes, rhonchi, rales.  Abdomen: Soft, nontender, nondistended, normal bowel sounds. No masses palpated. No hernias.  Musculoskeletal: Gait normal. Muscle strength/tone normal. Normal range of motion of all extremities.   Skin: Warm, well perfused, normal skin turgor and color, no lesions or rashes noted.   Neurologic: Cranial nerves II-XII grossly intact. Deep tendon reflexes were 2+ and symmetric. Sensation normal bilaterally.   Psychiatric: Mood calm and affect normal.    Assessment & Plan  Medicare annual wellness visit, subsequent  Recommendations for men annual wellness exam:   Colonoscopy at age 50 or earlier if positive family history of colon cancer-up to date    Discuss prostate cancer screening between ages 55-69 or sooner if family history of prostate cancer-ordered (normal and stable in past)   One time screen for abdominal aortic aneurysm for men ages 65-75 who have ever smoked  Screening for lung cancer with low-dose CT in all adults age 55-77 who have a 30 pack-year smoking history and currently smoke or have quit within the past 15 years-n/a  Follow a healthy diet (Dash diet, Mediterranean diet)  Exercise 150 min/wk  Maintain healthy weight (BMI < 25)  Do not smoke   Alcohol in moderation (up to 2 drinks/day)   Get enough sleep (7-8 hours/night)  Make sure immunizations are up to date (influenza, pneumococcal, Tdap, shingles if age > 50)-due for  pneumococcal 23  Visit dentist twice yearly    Orders:  •  Hemoglobin A1c; Future  •  Lipid Panel; Future  •  Comprehensive metabolic panel; Future  •  PSA; Future    Anxiety  Stable, continue current medication       Vitamin D deficiency  Lab ordered       Hyperlipidemia, unspecified hyperlipidemia type  CT cardiac calcium score test ordered and pending  Orders:  •  CBC; Future    Body mass index (BMI) of 23.0 to 23.9 in adult         Screening for multiple conditions  Screened for depression/anxiety       Urinary frequency  We discussed hydrating more in the morning and tapering as the day progresses, avoid liquids 2 hours prior to bed time  Orders:  •  Urinalysis with Reflex Microscopic; Future  •  Urine Culture; Future    Major depressive disorder, remission status unspecified, unspecified whether recurrent    Orders:  •  ARIPiprazole (Abilify) 5 mg tablet; Take 1 tablet (5 mg) by mouth once daily.  •  venlafaxine XR (Effexor-XR) 150 mg 24 hr capsule; Take 1 capsule (150 mg) by mouth once daily.    Mild episode of recurrent major depressive disorder (CMS-HCC)  See above       Benign essential hypertension  Hypertension (High Blood Pressure)  -elevated   -START lisinopril 5mg (1/2 tablet) on the morning, keep a log of blood pressures and bring with you to next appointment  -follow a low sodium diet  -limit stress, alcohol, tobacco and caffeine as much as possible    It is important to control Blood Pressure for reducing risk of stroke, heart attack, kidney damage, and circulatory problems from clogged arteries.  Advised new blood pressure goals based on evidence from recent studies showed blood pressure should be less than 130/80.   Check your BP at least weekly and If your BP is above this goal on repeated measurements, please contact us so we can make treatment adjustments.  Diet recommendations - reduce sodium intake (less than 2,400 mg/day), follow DASH diet, increase exercise (150 minutes per week), lose  weight (Goal BMI < 25).   Generally recommend follow up at least every 6 months.    If you are less than 60 years old, have diabetes mellitus, or chronic kidney disease, your goal blood pressure is < 140/90.  If you are older than 60 years old and do not have diabetes or kidney disease, your goal blood pressure is < 150/90.    Orders:  •  lisinopril 10 mg tablet; Take 1 tablet (10 mg) by mouth once daily.     Depression Screening  5 - 10 minutes were spent screening for depression.      Follow up in 4-6 weeks (EKG next appointment)

## 2025-05-07 NOTE — ASSESSMENT & PLAN NOTE
Recommendations for men annual wellness exam:   Colonoscopy at age 50 or earlier if positive family history of colon cancer-up to date    Discuss prostate cancer screening between ages 55-69 or sooner if family history of prostate cancer-ordered (normal and stable in past)   One time screen for abdominal aortic aneurysm for men ages 65-75 who have ever smoked  Screening for lung cancer with low-dose CT in all adults age 55-77 who have a 30 pack-year smoking history and currently smoke or have quit within the past 15 years-n/a  Follow a healthy diet (Dash diet, Mediterranean diet)  Exercise 150 min/wk  Maintain healthy weight (BMI < 25)  Do not smoke   Alcohol in moderation (up to 2 drinks/day)   Get enough sleep (7-8 hours/night)  Make sure immunizations are up to date (influenza, pneumococcal, Tdap, shingles if age > 50)-due for pneumococcal 25  Visit dentist twice yearly    Orders:    Hemoglobin A1c; Future    Lipid Panel; Future    Comprehensive metabolic panel; Future    PSA; Future

## 2025-06-08 ASSESSMENT — ENCOUNTER SYMPTOMS
HYPERTENSION: 1
ORTHOPNEA: 0
BLURRED VISION: 0
PND: 0
SWEATS: 0
HEADACHES: 0
SHORTNESS OF BREATH: 0
PALPITATIONS: 0
NECK PAIN: 0

## 2025-06-11 ASSESSMENT — ENCOUNTER SYMPTOMS
NECK PAIN: 0
PALPITATIONS: 0
SHORTNESS OF BREATH: 0
ORTHOPNEA: 0
HYPERTENSION: 1
BLURRED VISION: 0
HEADACHES: 0
PND: 0
SWEATS: 0

## 2025-06-12 ENCOUNTER — OFFICE VISIT (OUTPATIENT)
Dept: PRIMARY CARE | Facility: CLINIC | Age: 70
End: 2025-06-12
Payer: MEDICARE

## 2025-06-12 ENCOUNTER — APPOINTMENT (OUTPATIENT)
Dept: PRIMARY CARE | Facility: CLINIC | Age: 70
End: 2025-06-12
Payer: MEDICARE

## 2025-06-12 VITALS
DIASTOLIC BLOOD PRESSURE: 72 MMHG | HEIGHT: 67 IN | WEIGHT: 152.9 LBS | SYSTOLIC BLOOD PRESSURE: 144 MMHG | BODY MASS INDEX: 24 KG/M2 | TEMPERATURE: 98.3 F | RESPIRATION RATE: 12 BRPM | HEART RATE: 69 BPM | OXYGEN SATURATION: 98 %

## 2025-06-12 DIAGNOSIS — I10 BENIGN ESSENTIAL HYPERTENSION: Primary | Chronic | ICD-10-CM

## 2025-06-12 PROCEDURE — 93000 ELECTROCARDIOGRAM COMPLETE: CPT | Performed by: FAMILY MEDICINE

## 2025-06-12 RX ORDER — LISINOPRIL 10 MG/1
15 TABLET ORAL DAILY
Qty: 100 TABLET | Refills: 1 | Status: SHIPPED | OUTPATIENT
Start: 2025-06-12 | End: 2026-07-17

## 2025-06-12 ASSESSMENT — ENCOUNTER SYMPTOMS
ORTHOPNEA: 0
SWEATS: 0
HYPERTENSION: 1
BLURRED VISION: 0
NECK PAIN: 0
HEADACHES: 0
PND: 0
PALPITATIONS: 0
SHORTNESS OF BREATH: 0

## 2025-06-12 ASSESSMENT — PATIENT HEALTH QUESTIONNAIRE - PHQ9
2. FEELING DOWN, DEPRESSED OR HOPELESS: NOT AT ALL
1. LITTLE INTEREST OR PLEASURE IN DOING THINGS: NOT AT ALL
SUM OF ALL RESPONSES TO PHQ9 QUESTIONS 1 AND 2: 0

## 2025-06-12 NOTE — PROGRESS NOTES
"Subjective   Patient ID: Vincenzo Cummings is a 70 y.o. male who presents for HTN.    Hypertension  This is a chronic problem. The current episode started more than 1 month ago. The problem has been gradually worsening since onset. The problem is resistant. Associated symptoms include peripheral edema. Pertinent negatives include no anxiety, blurred vision, chest pain, headaches, malaise/fatigue, neck pain, orthopnea, palpitations, PND, shortness of breath or sweats. There are no associated agents to hypertension. Risk factors for coronary artery disease include dyslipidemia. There are no compliance problems.     The patient presents for follow up of hypertension.   Patient denies symptoms including headaches, dizziness, vision changes, syncope, chest pain, palpitations, dyspnea, and edema.   Medications are being taken as directed and tolerated well without side effects: Lisinopril 15mg   Blood pressure is monitored outside the office and is at goal.   The patient reports adherence to a low salt diet and is getting regular exercise (active at house, walking).    Review of Systems   Constitutional:  Negative for malaise/fatigue.   Eyes:  Negative for blurred vision.   Respiratory:  Negative for shortness of breath.    Cardiovascular:  Negative for chest pain, palpitations, orthopnea and PND.   Musculoskeletal:  Negative for neck pain.   Neurological:  Negative for headaches.   All other systems reviewed and are negative.    Objective   /72 (BP Location: Right arm, Patient Position: Sitting, BP Cuff Size: Adult)   Pulse 69   Temp 36.8 °C (98.3 °F) (Temporal)   Resp 12   Ht 1.702 m (5' 7\")   Wt 69.4 kg (152 lb 14.4 oz)   SpO2 98%   BMI 23.95 kg/m²     Physical Exam  Constitutional: Well developed, well nourished, alert and in no acute distress   Eyes: Normal external exam.   Cardiovascular: Regular rate and rhythm, normal S1 and S2, no murmurs, gallops, or rubs. Radial pulses normal. No peripheral " edema.  Pulmonary: No respiratory distress, lungs clear to auscultation bilaterally. No wheezes, rhonchi, rales.  Skin: Warm, well perfused, normal skin turgor and color.   Neurologic: Cranial nerves II-XII grossly intact.   Psychiatric: Mood calm and affect normal.    Assessment/Plan   Hypertension (High Blood Pressure)  -stable  -continue current medications (lisinopril 15mg)  -follow a low sodium diet  -limit stress, alcohol, tobacco and caffeine as much as possible    It is important to control Blood Pressure for reducing risk of stroke, heart attack, kidney damage, and circulatory problems from clogged arteries.  Advised new blood pressure goals based on evidence from recent studies showed blood pressure should be less than 130/80.   Check your BP at least weekly and If your BP is above this goal on repeated measurements, please contact us so we can make treatment adjustments.  Diet recommendations - reduce sodium intake (less than 2,400 mg/day), follow DASH diet, increase exercise (150 minutes per week), lose weight (Goal BMI < 25).   Generally recommend follow up at least every 6 months.    If you are less than 60 years old, have diabetes mellitus, or chronic kidney disease, your goal blood pressure is < 140/90.  If you are older than 60 years old and do not have diabetes or kidney disease, your goal blood pressure is < 150/90.

## 2025-10-08 ENCOUNTER — APPOINTMENT (OUTPATIENT)
Dept: PRIMARY CARE | Facility: CLINIC | Age: 70
End: 2025-10-08
Payer: MEDICARE